# Patient Record
Sex: FEMALE | Race: WHITE | NOT HISPANIC OR LATINO | Employment: OTHER | URBAN - METROPOLITAN AREA
[De-identification: names, ages, dates, MRNs, and addresses within clinical notes are randomized per-mention and may not be internally consistent; named-entity substitution may affect disease eponyms.]

---

## 2017-09-06 ENCOUNTER — HOSPITAL ENCOUNTER (OUTPATIENT)
Dept: MAMMOGRAPHY | Facility: HOSPITAL | Age: 47
Discharge: HOME | End: 2017-09-06
Attending: OBSTETRICS & GYNECOLOGY

## 2017-09-06 DIAGNOSIS — Z12.31 VISIT FOR SCREENING MAMMOGRAM: ICD-10-CM

## 2017-11-20 ENCOUNTER — COMMUNICATION - HEALTHEAST (OUTPATIENT)
Dept: INTERNAL MEDICINE | Facility: CLINIC | Age: 47
End: 2017-11-20

## 2018-06-28 ENCOUNTER — OFFICE VISIT - HEALTHEAST (OUTPATIENT)
Dept: INTERNAL MEDICINE | Facility: CLINIC | Age: 48
End: 2018-06-28

## 2018-06-28 DIAGNOSIS — S62.101A FRACTURE OF WRIST, RIGHT, CLOSED, INITIAL ENCOUNTER: ICD-10-CM

## 2018-06-28 DIAGNOSIS — M81.0 OSTEOPOROSIS: ICD-10-CM

## 2018-06-28 LAB
ALBUMIN SERPL-MCNC: 4.8 G/DL (ref 3.5–5)
ALP SERPL-CCNC: 97 U/L (ref 45–120)
ALT SERPL W P-5'-P-CCNC: 44 U/L (ref 0–45)
ANION GAP SERPL CALCULATED.3IONS-SCNC: 11 MMOL/L (ref 5–18)
AST SERPL W P-5'-P-CCNC: 33 U/L (ref 0–40)
BILIRUB SERPL-MCNC: 0.4 MG/DL (ref 0–1)
BUN SERPL-MCNC: 13 MG/DL (ref 8–22)
CALCIUM SERPL-MCNC: 10.3 MG/DL (ref 8.5–10.5)
CALCIUM SERPL-MCNC: 10.4 MG/DL (ref 8.5–10.5)
CHLORIDE BLD-SCNC: 107 MMOL/L (ref 98–107)
CO2 SERPL-SCNC: 26 MMOL/L (ref 22–31)
CREAT SERPL-MCNC: 0.67 MG/DL (ref 0.6–1.1)
CREAT SERPL-MCNC: 0.67 MG/DL (ref 0.6–1.1)
ERYTHROCYTE [DISTWIDTH] IN BLOOD BY AUTOMATED COUNT: 11.7 % (ref 11–14.5)
GFR SERPL CREATININE-BSD FRML MDRD: >60 ML/MIN/1.73M2
GFR SERPL CREATININE-BSD FRML MDRD: >60 ML/MIN/1.73M2
GLUCOSE BLD-MCNC: 93 MG/DL (ref 70–125)
HCT VFR BLD AUTO: 44.3 % (ref 35–47)
HGB BLD-MCNC: 14.6 G/DL (ref 12–16)
MAGNESIUM SERPL-MCNC: 2.4 MG/DL (ref 1.8–2.6)
MCH RBC QN AUTO: 29.2 PG (ref 27–34)
MCHC RBC AUTO-ENTMCNC: 33 G/DL (ref 32–36)
MCV RBC AUTO: 89 FL (ref 80–100)
PHOSPHATE SERPL-MCNC: 4 MG/DL (ref 2.5–4.5)
PLATELET # BLD AUTO: 296 THOU/UL (ref 140–440)
PMV BLD AUTO: 7.2 FL (ref 7–10)
POTASSIUM BLD-SCNC: 4.1 MMOL/L (ref 3.5–5)
PROT SERPL-MCNC: 7.8 G/DL (ref 6–8)
PTH-INTACT SERPL-MCNC: 66 PG/ML (ref 10–86)
RBC # BLD AUTO: 5 MILL/UL (ref 3.8–5.4)
SODIUM SERPL-SCNC: 144 MMOL/L (ref 136–145)
TSH SERPL DL<=0.005 MIU/L-ACNC: 2.27 UIU/ML (ref 0.3–5)
WBC: 5.2 THOU/UL (ref 4–11)

## 2018-06-28 ASSESSMENT — MIFFLIN-ST. JEOR: SCORE: 1141.36

## 2018-06-29 LAB
25(OH)D3 SERPL-MCNC: 26.6 NG/ML (ref 30–80)
ALBUMIN PERCENT: 68.1 % (ref 51–67)
ALBUMIN SERPL ELPH-MCNC: 5.3 G/DL (ref 3.2–4.7)
ALPHA 1 PERCENT: 2.3 % (ref 2–4)
ALPHA 2 PERCENT: 9.5 % (ref 5–13)
ALPHA1 GLOB SERPL ELPH-MCNC: 0.2 G/DL (ref 0.1–0.3)
ALPHA2 GLOB SERPL ELPH-MCNC: 0.7 G/DL (ref 0.4–0.9)
B-GLOBULIN SERPL ELPH-MCNC: 0.9 G/DL (ref 0.7–1.2)
BETA PERCENT: 12.1 % (ref 10–17)
GAMMA GLOB SERPL ELPH-MCNC: 0.6 G/DL (ref 0.6–1.4)
GAMMA GLOBULIN PERCENT: 8 % (ref 9–20)
PATH ICD:: ABNORMAL
PROT PATTERN SERPL ELPH-IMP: ABNORMAL
PROT SERPL-MCNC: 7.8 G/DL (ref 6–8)
REVIEWING PATHOLOGIST: ABNORMAL

## 2018-06-30 LAB — ALP BONE SERPL-MCNC: 19.8 UG/L

## 2018-07-05 LAB
GLIADIN IGA SER-ACNC: 0.6 U/ML
GLIADIN IGG SER-ACNC: 5.9 U/ML
IGA SERPL-MCNC: 316 MG/DL (ref 65–400)
TTG IGA SER-ACNC: 0.7 U/ML
TTG IGG SER-ACNC: <0.6 U/ML

## 2018-07-06 ENCOUNTER — COMMUNICATION - HEALTHEAST (OUTPATIENT)
Dept: INTERNAL MEDICINE | Facility: CLINIC | Age: 48
End: 2018-07-06

## 2018-07-31 ENCOUNTER — OFFICE VISIT - HEALTHEAST (OUTPATIENT)
Dept: INTERNAL MEDICINE | Facility: CLINIC | Age: 48
End: 2018-07-31

## 2018-07-31 ENCOUNTER — RECORDS - HEALTHEAST (OUTPATIENT)
Dept: ADMINISTRATIVE | Facility: OTHER | Age: 48
End: 2018-07-31

## 2018-07-31 ENCOUNTER — RECORDS - HEALTHEAST (OUTPATIENT)
Dept: BONE DENSITY | Facility: CLINIC | Age: 48
End: 2018-07-31

## 2018-07-31 DIAGNOSIS — M81.0 OSTEOPOROSIS: ICD-10-CM

## 2018-07-31 DIAGNOSIS — M81.0 AGE-RELATED OSTEOPOROSIS WITHOUT CURRENT PATHOLOGICAL FRACTURE: ICD-10-CM

## 2019-02-01 ENCOUNTER — AMBULATORY - HEALTHEAST (OUTPATIENT)
Dept: NURSING | Facility: CLINIC | Age: 49
End: 2019-02-01

## 2019-08-29 ENCOUNTER — RECORDS - HEALTHEAST (OUTPATIENT)
Dept: ADMINISTRATIVE | Facility: OTHER | Age: 49
End: 2019-08-29

## 2019-08-29 ENCOUNTER — OFFICE VISIT - HEALTHEAST (OUTPATIENT)
Dept: INTERNAL MEDICINE | Facility: CLINIC | Age: 49
End: 2019-08-29

## 2019-08-29 ENCOUNTER — COMMUNICATION - HEALTHEAST (OUTPATIENT)
Dept: TELEHEALTH | Facility: CLINIC | Age: 49
End: 2019-08-29

## 2019-08-29 ENCOUNTER — RECORDS - HEALTHEAST (OUTPATIENT)
Dept: BONE DENSITY | Facility: CLINIC | Age: 49
End: 2019-08-29

## 2019-08-29 DIAGNOSIS — M81.0 AGE-RELATED OSTEOPOROSIS WITHOUT CURRENT PATHOLOGICAL FRACTURE: ICD-10-CM

## 2019-08-29 DIAGNOSIS — M81.8 OTHER OSTEOPOROSIS WITHOUT CURRENT PATHOLOGICAL FRACTURE: ICD-10-CM

## 2019-08-29 ASSESSMENT — MIFFLIN-ST. JEOR: SCORE: 1142.72

## 2019-09-11 ENCOUNTER — RECORDS - HEALTHEAST (OUTPATIENT)
Dept: LAB | Facility: CLINIC | Age: 49
End: 2019-09-11

## 2019-09-11 LAB
ALBUMIN SERPL-MCNC: 4.5 G/DL (ref 3.5–5)
ALP SERPL-CCNC: 68 U/L (ref 45–120)
ALT SERPL W P-5'-P-CCNC: 51 U/L (ref 0–45)
ANION GAP SERPL CALCULATED.3IONS-SCNC: 10 MMOL/L (ref 5–18)
AST SERPL W P-5'-P-CCNC: 29 U/L (ref 0–40)
BILIRUB SERPL-MCNC: 0.3 MG/DL (ref 0–1)
BUN SERPL-MCNC: 10 MG/DL (ref 8–22)
CALCIUM SERPL-MCNC: 9.1 MG/DL (ref 8.5–10.5)
CHLORIDE BLD-SCNC: 108 MMOL/L (ref 98–107)
CHOLEST SERPL-MCNC: 269 MG/DL
CO2 SERPL-SCNC: 22 MMOL/L (ref 22–31)
CREAT SERPL-MCNC: 0.65 MG/DL (ref 0.6–1.1)
FASTING STATUS PATIENT QL REPORTED: ABNORMAL
GFR SERPL CREATININE-BSD FRML MDRD: >60 ML/MIN/1.73M2
GLUCOSE BLD-MCNC: 91 MG/DL (ref 70–125)
HDLC SERPL-MCNC: 50 MG/DL
LDLC SERPL CALC-MCNC: 179 MG/DL
POTASSIUM BLD-SCNC: 4.2 MMOL/L (ref 3.5–5)
PROT SERPL-MCNC: 7.2 G/DL (ref 6–8)
SODIUM SERPL-SCNC: 140 MMOL/L (ref 136–145)
TRIGL SERPL-MCNC: 202 MG/DL

## 2019-09-13 LAB — VZV IGG SER QL IA: POSITIVE

## 2020-02-13 ENCOUNTER — AMBULATORY - HEALTHEAST (OUTPATIENT)
Dept: INTERNAL MEDICINE | Facility: CLINIC | Age: 50
End: 2020-02-13

## 2020-02-13 DIAGNOSIS — M81.8 OTHER OSTEOPOROSIS WITHOUT CURRENT PATHOLOGICAL FRACTURE: ICD-10-CM

## 2020-02-13 DIAGNOSIS — Z92.29 PERSONAL HISTORY OF OTHER DRUG THERAPY: ICD-10-CM

## 2020-03-02 ENCOUNTER — AMBULATORY - HEALTHEAST (OUTPATIENT)
Dept: NURSING | Facility: CLINIC | Age: 50
End: 2020-03-02

## 2020-03-02 RX ORDER — CALCIUM CARBONATE/MULTIVITAMIN
TABLET,CHEWABLE ORAL
Status: SHIPPED | COMMUNITY
Start: 2020-03-02 | End: 2021-10-27

## 2020-07-30 ENCOUNTER — COMMUNICATION - HEALTHEAST (OUTPATIENT)
Dept: INTERNAL MEDICINE | Facility: CLINIC | Age: 50
End: 2020-07-30

## 2020-07-30 DIAGNOSIS — M81.8 OTHER OSTEOPOROSIS WITHOUT CURRENT PATHOLOGICAL FRACTURE: ICD-10-CM

## 2020-09-02 ENCOUNTER — AMBULATORY - HEALTHEAST (OUTPATIENT)
Dept: INTERNAL MEDICINE | Facility: CLINIC | Age: 50
End: 2020-09-02

## 2020-09-02 DIAGNOSIS — M81.8 OTHER OSTEOPOROSIS WITHOUT CURRENT PATHOLOGICAL FRACTURE: ICD-10-CM

## 2020-09-02 DIAGNOSIS — Z92.29 PERSONAL HISTORY OF OTHER DRUG THERAPY: ICD-10-CM

## 2020-09-03 ENCOUNTER — RECORDS - HEALTHEAST (OUTPATIENT)
Dept: BONE DENSITY | Facility: CLINIC | Age: 50
End: 2020-09-03

## 2020-09-03 ENCOUNTER — COMMUNICATION - HEALTHEAST (OUTPATIENT)
Dept: TELEHEALTH | Facility: CLINIC | Age: 50
End: 2020-09-03

## 2020-09-03 ENCOUNTER — RECORDS - HEALTHEAST (OUTPATIENT)
Dept: ADMINISTRATIVE | Facility: OTHER | Age: 50
End: 2020-09-03

## 2020-09-03 DIAGNOSIS — M81.8 OTHER OSTEOPOROSIS WITHOUT CURRENT PATHOLOGICAL FRACTURE: ICD-10-CM

## 2020-09-16 ENCOUNTER — COMMUNICATION - HEALTHEAST (OUTPATIENT)
Dept: INTERNAL MEDICINE | Facility: CLINIC | Age: 50
End: 2020-09-16

## 2020-09-16 ENCOUNTER — OFFICE VISIT - HEALTHEAST (OUTPATIENT)
Dept: INTERNAL MEDICINE | Facility: CLINIC | Age: 50
End: 2020-09-16

## 2020-09-16 DIAGNOSIS — M81.8 OTHER OSTEOPOROSIS WITHOUT CURRENT PATHOLOGICAL FRACTURE: ICD-10-CM

## 2020-09-16 DIAGNOSIS — Z92.29 PERSONAL HISTORY OF OTHER DRUG THERAPY: ICD-10-CM

## 2020-09-16 DIAGNOSIS — S62.101A FRACTURE OF WRIST, RIGHT, CLOSED, INITIAL ENCOUNTER: ICD-10-CM

## 2020-09-24 ENCOUNTER — AMBULATORY - HEALTHEAST (OUTPATIENT)
Dept: LAB | Facility: CLINIC | Age: 50
End: 2020-09-24

## 2020-09-24 DIAGNOSIS — M81.8 OTHER OSTEOPOROSIS WITHOUT CURRENT PATHOLOGICAL FRACTURE: ICD-10-CM

## 2020-09-24 LAB
CALCIUM 24H UR-MRATE: 167 MG/24HR (ref 20–275)
CALCIUM UR-MCNC: 4.9 MG/DL
SPECIMEN VOL UR: 3400 ML

## 2020-09-29 ENCOUNTER — COMMUNICATION - HEALTHEAST (OUTPATIENT)
Dept: INTERNAL MEDICINE | Facility: CLINIC | Age: 50
End: 2020-09-29

## 2020-10-15 ENCOUNTER — OFFICE VISIT - HEALTHEAST (OUTPATIENT)
Dept: INTERNAL MEDICINE | Facility: CLINIC | Age: 50
End: 2020-10-15

## 2020-10-15 DIAGNOSIS — M81.8 OTHER OSTEOPOROSIS WITHOUT CURRENT PATHOLOGICAL FRACTURE: ICD-10-CM

## 2020-10-15 DIAGNOSIS — Z79.899 MEDICATION MANAGEMENT: ICD-10-CM

## 2020-10-16 LAB
ATRIAL RATE - MUSE: 66 BPM
DIASTOLIC BLOOD PRESSURE - MUSE: NORMAL
INTERPRETATION ECG - MUSE: NORMAL
P AXIS - MUSE: 66 DEGREES
PR INTERVAL - MUSE: 180 MS
QRS DURATION - MUSE: 74 MS
QT - MUSE: 404 MS
QTC - MUSE: 423 MS
R AXIS - MUSE: 50 DEGREES
SYSTOLIC BLOOD PRESSURE - MUSE: NORMAL
T AXIS - MUSE: 55 DEGREES
VENTRICULAR RATE- MUSE: 66 BPM

## 2020-11-10 ENCOUNTER — COMMUNICATION - HEALTHEAST (OUTPATIENT)
Dept: INTERNAL MEDICINE | Facility: CLINIC | Age: 50
End: 2020-11-10

## 2020-11-11 ENCOUNTER — OFFICE VISIT - HEALTHEAST (OUTPATIENT)
Dept: INTERNAL MEDICINE | Facility: CLINIC | Age: 50
End: 2020-11-11

## 2020-11-11 DIAGNOSIS — M81.8 OTHER OSTEOPOROSIS WITHOUT CURRENT PATHOLOGICAL FRACTURE: ICD-10-CM

## 2020-11-11 LAB
ANION GAP SERPL CALCULATED.3IONS-SCNC: 12 MMOL/L (ref 5–18)
BUN SERPL-MCNC: 12 MG/DL (ref 8–22)
CALCIUM SERPL-MCNC: 10 MG/DL (ref 8.5–10.5)
CALCIUM, IONIZED MEASURED: 1.27 MMOL/L (ref 1.11–1.3)
CHLORIDE BLD-SCNC: 107 MMOL/L (ref 98–107)
CO2 SERPL-SCNC: 24 MMOL/L (ref 22–31)
CREAT SERPL-MCNC: 0.68 MG/DL (ref 0.6–1.1)
GFR SERPL CREATININE-BSD FRML MDRD: >60 ML/MIN/1.73M2
GLUCOSE BLD-MCNC: 103 MG/DL (ref 70–125)
ION CA PH 7.4: 1.17 MMOL/L (ref 1.11–1.3)
PH: 7.25 (ref 7.35–7.45)
POTASSIUM BLD-SCNC: 4.6 MMOL/L (ref 3.5–5)
SODIUM SERPL-SCNC: 143 MMOL/L (ref 136–145)

## 2020-11-11 RX ORDER — ECHINACEA 400 MG
CAPSULE ORAL
Status: SHIPPED | COMMUNITY
Start: 2020-11-11 | End: 2022-10-13

## 2020-12-16 ENCOUNTER — OFFICE VISIT - HEALTHEAST (OUTPATIENT)
Dept: INTERNAL MEDICINE | Facility: CLINIC | Age: 50
End: 2020-12-16

## 2020-12-16 DIAGNOSIS — M81.8 OTHER OSTEOPOROSIS WITHOUT CURRENT PATHOLOGICAL FRACTURE: ICD-10-CM

## 2021-01-13 ENCOUNTER — OFFICE VISIT - HEALTHEAST (OUTPATIENT)
Dept: INTERNAL MEDICINE | Facility: CLINIC | Age: 51
End: 2021-01-13

## 2021-01-13 DIAGNOSIS — M81.8 OTHER OSTEOPOROSIS WITHOUT CURRENT PATHOLOGICAL FRACTURE: ICD-10-CM

## 2021-02-10 ENCOUNTER — OFFICE VISIT - HEALTHEAST (OUTPATIENT)
Dept: INTERNAL MEDICINE | Facility: CLINIC | Age: 51
End: 2021-02-10

## 2021-02-10 DIAGNOSIS — M81.8 OTHER OSTEOPOROSIS WITHOUT CURRENT PATHOLOGICAL FRACTURE: ICD-10-CM

## 2021-03-11 ENCOUNTER — OFFICE VISIT - HEALTHEAST (OUTPATIENT)
Dept: INTERNAL MEDICINE | Facility: CLINIC | Age: 51
End: 2021-03-11

## 2021-03-11 DIAGNOSIS — M81.8 OTHER OSTEOPOROSIS WITHOUT CURRENT PATHOLOGICAL FRACTURE: ICD-10-CM

## 2021-03-11 RX ORDER — OMEPRAZOLE 10 MG/1
20 CAPSULE, DELAYED RELEASE ORAL
Status: SHIPPED | COMMUNITY
Start: 2021-03-11 | End: 2021-08-11

## 2021-04-08 ENCOUNTER — OFFICE VISIT - HEALTHEAST (OUTPATIENT)
Dept: INTERNAL MEDICINE | Facility: CLINIC | Age: 51
End: 2021-04-08

## 2021-04-08 DIAGNOSIS — E28.319 PREMATURE MENOPAUSE: ICD-10-CM

## 2021-04-08 DIAGNOSIS — Z80.3 FAMILY HISTORY OF MALIGNANT NEOPLASM OF BREAST: ICD-10-CM

## 2021-04-08 DIAGNOSIS — R79.89 ABNORMAL LIVER FUNCTION TEST: ICD-10-CM

## 2021-04-08 DIAGNOSIS — R63.5 WEIGHT GAIN: ICD-10-CM

## 2021-04-08 DIAGNOSIS — M81.8 OTHER OSTEOPOROSIS WITHOUT CURRENT PATHOLOGICAL FRACTURE: ICD-10-CM

## 2021-04-08 LAB
ALBUMIN SERPL-MCNC: 4.3 G/DL (ref 3.5–5)
ALP SERPL-CCNC: 120 U/L (ref 45–120)
ALT SERPL W P-5'-P-CCNC: 67 U/L (ref 0–45)
ANION GAP SERPL CALCULATED.3IONS-SCNC: 11 MMOL/L (ref 5–18)
AST SERPL W P-5'-P-CCNC: 41 U/L (ref 0–40)
BILIRUB SERPL-MCNC: 0.3 MG/DL (ref 0–1)
BUN SERPL-MCNC: 9 MG/DL (ref 8–22)
CALCIUM SERPL-MCNC: 9.4 MG/DL (ref 8.5–10.5)
CHLORIDE BLD-SCNC: 110 MMOL/L (ref 98–107)
CO2 SERPL-SCNC: 24 MMOL/L (ref 22–31)
CREAT SERPL-MCNC: 0.67 MG/DL (ref 0.6–1.1)
ERYTHROCYTE [DISTWIDTH] IN BLOOD BY AUTOMATED COUNT: 12.6 % (ref 11–14.5)
GFR SERPL CREATININE-BSD FRML MDRD: >60 ML/MIN/1.73M2
GLUCOSE BLD-MCNC: 93 MG/DL (ref 70–125)
HCT VFR BLD AUTO: 39.8 % (ref 35–47)
HGB BLD-MCNC: 13.2 G/DL (ref 12–16)
MCH RBC QN AUTO: 29.9 PG (ref 27–34)
MCHC RBC AUTO-ENTMCNC: 33.2 G/DL (ref 32–36)
MCV RBC AUTO: 90 FL (ref 80–100)
PLATELET # BLD AUTO: 267 THOU/UL (ref 140–440)
PMV BLD AUTO: 9.4 FL (ref 7–10)
POTASSIUM BLD-SCNC: 4.1 MMOL/L (ref 3.5–5)
PROT SERPL-MCNC: 7.1 G/DL (ref 6–8)
RBC # BLD AUTO: 4.42 MILL/UL (ref 3.8–5.4)
SODIUM SERPL-SCNC: 145 MMOL/L (ref 136–145)
TSH SERPL DL<=0.005 MIU/L-ACNC: 2.68 UIU/ML (ref 0.3–5)
WBC: 4.8 THOU/UL (ref 4–11)

## 2021-04-09 ENCOUNTER — COMMUNICATION - HEALTHEAST (OUTPATIENT)
Dept: INTERNAL MEDICINE | Facility: CLINIC | Age: 51
End: 2021-04-09

## 2021-04-09 DIAGNOSIS — R74.8 ABNORMAL LIVER ENZYMES: ICD-10-CM

## 2021-04-09 LAB
25(OH)D3 SERPL-MCNC: 34.6 NG/ML (ref 30–80)
25(OH)D3 SERPL-MCNC: 34.6 NG/ML (ref 30–80)
HAV IGM SERPL QL IA: NEGATIVE
HBV CORE IGM SERPL QL IA: NEGATIVE
HBV SURFACE AG SERPL QL IA: NEGATIVE
HCV AB SERPL QL IA: NEGATIVE

## 2021-04-14 ENCOUNTER — HOSPITAL ENCOUNTER (OUTPATIENT)
Dept: ULTRASOUND IMAGING | Facility: CLINIC | Age: 51
Discharge: HOME | End: 2021-04-14
Attending: INTERNAL MEDICINE

## 2021-04-14 DIAGNOSIS — R74.8 ABNORMAL LIVER ENZYMES: ICD-10-CM

## 2021-05-06 ENCOUNTER — OFFICE VISIT - HEALTHEAST (OUTPATIENT)
Dept: INTERNAL MEDICINE | Facility: CLINIC | Age: 51
End: 2021-05-06

## 2021-05-06 DIAGNOSIS — R74.8 ELEVATED LIVER ENZYMES: ICD-10-CM

## 2021-05-06 DIAGNOSIS — M81.8 OTHER OSTEOPOROSIS WITHOUT CURRENT PATHOLOGICAL FRACTURE: ICD-10-CM

## 2021-05-06 DIAGNOSIS — R04.0 NASAL BLEEDING: ICD-10-CM

## 2021-05-06 LAB
ALBUMIN SERPL-MCNC: 4.5 G/DL (ref 3.5–5)
ALP SERPL-CCNC: 120 U/L (ref 45–120)
ALT SERPL W P-5'-P-CCNC: 43 U/L (ref 0–45)
ANION GAP SERPL CALCULATED.3IONS-SCNC: 12 MMOL/L (ref 5–18)
APTT PPP: 30 SECONDS (ref 24–37)
AST SERPL W P-5'-P-CCNC: 26 U/L (ref 0–40)
BILIRUB SERPL-MCNC: 0.4 MG/DL (ref 0–1)
BUN SERPL-MCNC: 9 MG/DL (ref 8–22)
CALCIUM SERPL-MCNC: 9.7 MG/DL (ref 8.5–10.5)
CHLORIDE BLD-SCNC: 107 MMOL/L (ref 98–107)
CO2 SERPL-SCNC: 24 MMOL/L (ref 22–31)
CREAT SERPL-MCNC: 0.7 MG/DL (ref 0.6–1.1)
ERYTHROCYTE [DISTWIDTH] IN BLOOD BY AUTOMATED COUNT: 12.6 % (ref 11–14.5)
FERRITIN SERPL-MCNC: 90 NG/ML (ref 10–130)
GFR SERPL CREATININE-BSD FRML MDRD: >60 ML/MIN/1.73M2
GLUCOSE BLD-MCNC: 94 MG/DL (ref 70–125)
HCT VFR BLD AUTO: 42.2 % (ref 35–47)
HGB BLD-MCNC: 13.9 G/DL (ref 12–16)
INR PPP: 0.94 (ref 0.9–1.1)
IRON SATN MFR SERPL: 30 % (ref 20–50)
IRON SERPL-MCNC: 104 UG/DL (ref 42–175)
MCH RBC QN AUTO: 29.6 PG (ref 27–34)
MCHC RBC AUTO-ENTMCNC: 32.9 G/DL (ref 32–36)
MCV RBC AUTO: 90 FL (ref 80–100)
PLATELET # BLD AUTO: 296 THOU/UL (ref 140–440)
PMV BLD AUTO: 9.6 FL (ref 7–10)
POTASSIUM BLD-SCNC: 4.7 MMOL/L (ref 3.5–5)
PROT SERPL-MCNC: 7.4 G/DL (ref 6–8)
RBC # BLD AUTO: 4.7 MILL/UL (ref 3.8–5.4)
SODIUM SERPL-SCNC: 143 MMOL/L (ref 136–145)
TIBC SERPL-MCNC: 341 UG/DL (ref 313–563)
TRANSFERRIN SERPL-MCNC: 273 MG/DL (ref 212–360)
WBC: 5 THOU/UL (ref 4–11)

## 2021-05-07 ENCOUNTER — AMBULATORY - HEALTHEAST (OUTPATIENT)
Dept: NURSING | Facility: CLINIC | Age: 51
End: 2021-05-07

## 2021-05-27 ENCOUNTER — RECORDS - HEALTHEAST (OUTPATIENT)
Dept: ADMINISTRATIVE | Facility: CLINIC | Age: 51
End: 2021-05-27

## 2021-05-27 VITALS
OXYGEN SATURATION: 99 % | HEART RATE: 69 BPM | WEIGHT: 135.9 LBS | SYSTOLIC BLOOD PRESSURE: 128 MMHG | DIASTOLIC BLOOD PRESSURE: 70 MMHG

## 2021-05-30 ENCOUNTER — RECORDS - HEALTHEAST (OUTPATIENT)
Dept: ADMINISTRATIVE | Facility: CLINIC | Age: 51
End: 2021-05-30

## 2021-05-31 NOTE — PATIENT INSTRUCTIONS - HE
Prolia 10th today.  Prolia 11th in 6 months with my nurse. I will see you in 1 year.    DXA in 1 year .   Phone number to schedule [phone].    Daily calcium need is 5954-7961 mg a day from the diet and supplements.  Calcium citrate is easier to digest.  Vitamin D 2000 IU daily recommended.

## 2021-05-31 NOTE — PROGRESS NOTES
"  1. Other osteoporosis without current pathological fracture       Patient was educated on safety of Prolia utilizing Patient Counseling Chart for Healthcare Providers, as outlined by the Prolia REMS progam.     Return in about 6 months (around 2/29/2020) for Recheck, Prolia injection.    Patient Instructions   Prolia 10th today.  Prolia 11th in 6 months with my nurse. I will see you in 1 year.    DXA in 1 year .   Phone number to schedule [phone].    Daily calcium need is 9742-6872 mg a day from the diet and supplements.  Calcium citrate is easier to digest.  Vitamin D 2000 IU daily recommended.      Chief Complaint   Patient presents with     Osteoporosis Follow Up       /72 (Patient Site: Right Arm, Patient Position: Sitting, Cuff Size: Adult Regular)   Pulse 80   Ht 5' 2.5\" (1.588 m)   Wt 126 lb (57.2 kg)   BMI 22.68 kg/m        Did you experience any problems with previous Prolia injection? no  Any medication change in the last 6 months? no  Did you take prednisone or other immunosupressant drugs in the last 6 months   (chemo, transplant, rheum, dermatology conditions)? no  Did you have any serious infection in the last 6 months?no  Any recent hospitalizations?no  Do you plan any dental work in the next 2-3 months?no  How much calcium do you take daily from the diet and supplements?1200 mg  How much vit D do you take daily? 2000 IU  Last DXA? Done today, Reviewed and discussed      Patient is here today for the 10th Prolia injection. Patient tolerated previous injections well.   We discussed calcium and vit D daily needs today.   Next Prolia injection will be in 6 months.     15 minutes spent with the patient and more then 50 % of the time in counseling.  This note has been dictated using voice recognition software. Any grammatical or context distortions are unintentional and inherent to the software      Patient Active Problem List   Diagnosis     Osteoporosis     Fracture of wrist, right, " closed, initial encounter       Current Outpatient Medications on File Prior to Visit   Medication Sig Dispense Refill     B infantis/B ani/B bairon/B bifid (PROBIOTIC 4X ORAL) Take 1 capsule by mouth daily.       CALCIUM-MAGNESIUM-ZINC ORAL Take 1 tablet by mouth daily. Includes 810 U Vitamin d             Current Facility-Administered Medications on File Prior to Visit   Medication Dose Route Frequency Provider Last Rate Last Dose     denosumab 60 mg (PROLIA 60 mg/ml)  60 mg Subcutaneous Q6 Months Mera Taylor MD   60 mg at 09/09/15 0843     denosumab 60 mg (PROLIA 60 mg/ml)  60 mg Subcutaneous Q6 Months Saira Olivas MD   60 mg at 02/01/19 1100

## 2021-06-01 ENCOUNTER — RECORDS - HEALTHEAST (OUTPATIENT)
Dept: ADMINISTRATIVE | Facility: CLINIC | Age: 51
End: 2021-06-01

## 2021-06-01 VITALS — BODY MASS INDEX: 22.27 KG/M2 | HEIGHT: 63 IN | WEIGHT: 125.7 LBS

## 2021-06-01 VITALS — WEIGHT: 126 LBS | BODY MASS INDEX: 22.68 KG/M2

## 2021-06-02 ENCOUNTER — RECORDS - HEALTHEAST (OUTPATIENT)
Dept: ADMINISTRATIVE | Facility: CLINIC | Age: 51
End: 2021-06-02

## 2021-06-03 VITALS — BODY MASS INDEX: 22.32 KG/M2 | WEIGHT: 126 LBS | HEIGHT: 63 IN

## 2021-06-04 VITALS
DIASTOLIC BLOOD PRESSURE: 64 MMHG | OXYGEN SATURATION: 98 % | SYSTOLIC BLOOD PRESSURE: 121 MMHG | WEIGHT: 132.9 LBS | HEART RATE: 71 BPM | BODY MASS INDEX: 23.92 KG/M2

## 2021-06-05 VITALS
SYSTOLIC BLOOD PRESSURE: 98 MMHG | HEART RATE: 82 BPM | OXYGEN SATURATION: 99 % | WEIGHT: 135.6 LBS | DIASTOLIC BLOOD PRESSURE: 61 MMHG | BODY MASS INDEX: 24.41 KG/M2

## 2021-06-05 VITALS
DIASTOLIC BLOOD PRESSURE: 80 MMHG | WEIGHT: 136.9 LBS | BODY MASS INDEX: 24.64 KG/M2 | SYSTOLIC BLOOD PRESSURE: 130 MMHG | HEART RATE: 77 BPM | OXYGEN SATURATION: 98 %

## 2021-06-05 VITALS
HEART RATE: 87 BPM | BODY MASS INDEX: 24.68 KG/M2 | SYSTOLIC BLOOD PRESSURE: 120 MMHG | OXYGEN SATURATION: 100 % | WEIGHT: 137.1 LBS | DIASTOLIC BLOOD PRESSURE: 73 MMHG

## 2021-06-05 VITALS
DIASTOLIC BLOOD PRESSURE: 70 MMHG | SYSTOLIC BLOOD PRESSURE: 119 MMHG | OXYGEN SATURATION: 99 % | WEIGHT: 137.1 LBS | HEART RATE: 92 BPM | BODY MASS INDEX: 24.68 KG/M2

## 2021-06-05 VITALS
HEART RATE: 78 BPM | BODY MASS INDEX: 24.03 KG/M2 | DIASTOLIC BLOOD PRESSURE: 60 MMHG | WEIGHT: 133.5 LBS | OXYGEN SATURATION: 98 % | SYSTOLIC BLOOD PRESSURE: 119 MMHG

## 2021-06-05 VITALS
OXYGEN SATURATION: 100 % | DIASTOLIC BLOOD PRESSURE: 77 MMHG | SYSTOLIC BLOOD PRESSURE: 122 MMHG | BODY MASS INDEX: 24.17 KG/M2 | WEIGHT: 134.3 LBS | HEART RATE: 80 BPM

## 2021-06-05 VITALS
HEART RATE: 78 BPM | OXYGEN SATURATION: 99 % | DIASTOLIC BLOOD PRESSURE: 73 MMHG | WEIGHT: 134.4 LBS | SYSTOLIC BLOOD PRESSURE: 120 MMHG | BODY MASS INDEX: 24.19 KG/M2

## 2021-06-06 NOTE — PROGRESS NOTES
1. Did you experience any problems with previous Prolia injection? no  2. Do you feel sick today?(fever, RS, GI,  issues)? no  3. Any medication changes in the last 6 months? no  4. Did you take prednisone or other immunosuppressant drugs in the last 6 months?(chemo, transplant, rheu, dermatology conditions)? no  5. Did you have any serious infection in the last 6 months? (pancreatitis) no  6. Any recent hospitalizations/ surgeries (especially gastric bypass, thyroid, parathyroid)? no  7. Do you plan any dental work?(especially implants and extractions) in the next 2-3 months? no  8. Did you have any fractures in the last year? no      The following steps were completed to comply with the REMS program for Prolia:  1. Ordering provider has previously reviewed information in the Medication Guide and Patient Counseling Chart, including the serious risks of Prolia  and the symptoms of each risk and have been advised to seek prompt medical attention if they have signs or symptoms of any of the serious risks.  2. Provided each patient a copy of the Medication Guide and Patient Brochure.  See MAR for administration details.   Indication: Prolia  (denosumab) is a prescription medicine used to treat osteoporosis in patients who:   Are at high risk for fracture, meaning patients who have had a fracture related to osteoporosis, or who have multiple risk factors for fracture; Cannot use another osteoporosis medicine or other osteoporosis medicines did not work well.   The timeline for early/late injections would be 4 weeks early and any time after the 6 month grover. If a patient receives their injection late, then the subsequent injection would be 6 months from the date that they actually received the injection    Have the screening questions been asked prior to this administration? Yes

## 2021-06-08 ENCOUNTER — OFFICE VISIT - HEALTHEAST (OUTPATIENT)
Dept: INTERNAL MEDICINE | Facility: CLINIC | Age: 51
End: 2021-06-08

## 2021-06-08 DIAGNOSIS — M81.8 OTHER OSTEOPOROSIS WITHOUT CURRENT PATHOLOGICAL FRACTURE: ICD-10-CM

## 2021-06-08 DIAGNOSIS — M54.2 ARTHRALGIA OF NECK: ICD-10-CM

## 2021-06-08 ASSESSMENT — MIFFLIN-ST. JEOR: SCORE: 1192.62

## 2021-06-10 NOTE — TELEPHONE ENCOUNTER
Who is calling:  Patient  Reason for Call: Questioning the status of the below message that was left on 07/30/2020. Patient is upset the message has not been addressed. Please reach out to the patient and advise.  Date of last appointment with primary care: 08/29/2019  Okay to leave a detailed message: Yes

## 2021-06-10 NOTE — TELEPHONE ENCOUNTER
Pt informed that order was placed two weeks ago. Unsure why she wasn't notified. Pt given number to schedule dexa scan

## 2021-06-10 NOTE — TELEPHONE ENCOUNTER
Orders being requested:   Dexa Scan    Reason service is needed/diagnosis:   Osteoporosis    When are orders needed by:   STAT    Where to send Orders: [facility]     Okay to leave detailed message?  Yes    Please place order and notify patient to schedule.

## 2021-06-10 NOTE — TELEPHONE ENCOUNTER
Patient Instructions 8/29/19   Prolia 10th today.  Prolia 11th in 6 months with my nurse. I will see you in 1 year.     DXA in 1 year .

## 2021-06-11 NOTE — TELEPHONE ENCOUNTER
Patient notified and scheduled for tomorrow for lab.  Gianna Henry CMA ............... 5:10 PM, 09/16/20

## 2021-06-11 NOTE — TELEPHONE ENCOUNTER
I am still waiting to speak with someone from her insurance. They did not call me back. I hope to finish that next week and will call her back. She does not need to see cardiologist.

## 2021-06-11 NOTE — TELEPHONE ENCOUNTER
Please call the pt. I would like her to do 24h urine calcium test. Order is in, she can schedule lab only. Thanks

## 2021-06-11 NOTE — TELEPHONE ENCOUNTER
No. I am still waiting for response from her insurance about Evenity. If they pay for Prolia now, we will not be able to get Evenity covered.

## 2021-06-11 NOTE — PROGRESS NOTES
1. Other osteoporosis without current pathological fracture  romosozumab-aqqg injection 210 mg ([facility]ENITY)   2. Personal history of other drug therapy  romosozumab-aqqg injection 210 mg ([facility])   3. Fracture of wrist, right, closed, initial encounter  romosozumab-aqqg injection 210 mg ([facility])     Patient was educated on safety of Prolia utilizing Patient Counseling Chart for Healthcare Providers, as outlined by the Prolia REMS progam.     Return in about 4 weeks (around 10/14/2020) for Recheck, osteoporosis.    Patient Instructions   Read about Evenity.  I will call you when I find out if we can do it.      Chief Complaint   Patient presents with     Osteoporosis Follow Up     Osteo F/U       /64   Pulse 71   Wt 132 lb 14.4 oz (60.3 kg)   SpO2 98%   BMI 23.92 kg/m        Did you experience any problems with previous Prolia injection? no  Any medication change in the last 6 months? no  Did you take prednisone or other immunosupressant drugs in the last 6 months   (chemo, transplant, rheum, dermatology conditions)? no  Did you have any serious infection in the last 6 months?no  Any recent hospitalizations?no  Do you plan any dental work in the next 2-3 months?no  How much calcium do you take daily from the diet and supplements?1200 mg  How much vit D do you take daily? 2000 IU  Last DXA? 9/3/20  Reviewed and discussed      Patient is here today for the  Prolia injection. Patient tolerated previous injections well. On Prolia treatment from 6963-7966 and 7/2018 - now.  DXA showed decline in spine bone density again -6.7% compared to 2019.  Workup for secondary causes of osteoporosis done last year was normal:  Component      Latest Ref Rng & Units 6/28/2018 6/28/2018           3:59 PM  3:59 PM   Sodium      136 - 145 mmol/L  144   Potassium      3.5 - 5.0 mmol/L  4.1   Chloride      98 - 107 mmol/L  107   CO2      22 - 31 mmol/L  26   Anion Gap, Calculation      5 - 18 mmol/L  11   Glucose      70 - 125  mg/dL  93   BUN      8 - 22 mg/dL  13   Creatinine      0.60 - 1.10 mg/dL 0.67 0.67   GFR MDRD Af Amer      >60 mL/min/1.73m2 >60 >60   GFR MDRD Non Af Amer      >60 mL/min/1.73m2 >60 >60   Bilirubin, Total      0.0 - 1.0 mg/dL  0.4   Calcium      8.5 - 10.5 mg/dL 10.4 10.3   Protein, Total      6.0 - 8.0 g/dL  7.8   ALBUMIN      3.5 - 5.0 g/dL  4.8   Alkaline Phosphatase      45 - 120 U/L  97   AST      0 - 40 U/L  33   ALT      0 - 45 U/L  44   Albumin %      51.0 - 67.0 %     Albumin       3.2 - 4.7 g/dL     Alpha 1 %      2.0 - 4.0 %     Alpha 1      0.1 - 0.3 g/dL     Alpha 2 %      5.0 - 13.0 %     Alpha 2      0.4 - 0.9 g/dL     % Beta      10.0 - 17.0 %     Beta      0.7 - 1.2 g/dL     Gamma Globulin %      9.0 - 20.0 %     Gamma Globulin      0.6 - 1.4 g/dL     ELP Comment           Path ICD:           Interpreted By:           WBC      4.0 - 11.0 thou/uL 5.2    RBC      3.80 - 5.40 mill/uL 5.00    Hemoglobin      12.0 - 16.0 g/dL 14.6    Hematocrit      35.0 - 47.0 % 44.3    MCV      80 - 100 fL 89    MCH      27.0 - 34.0 pg 29.2    MCHC      32.0 - 36.0 g/dL 33.0    RDW      11.0 - 14.5 % 11.7    Platelets      140 - 440 thou/uL 296    MPV      7.0 - 10.0 fL 7.2    PTH      10 - 86 pg/mL 66    Phosphorus      2.5 - 4.5 mg/dL 4.0    Gliadin IgA      0.0-<7.0 U/mL 0.6    Gliadin IgG      0.0-<7.0 U/mL 5.9    Tissue Transglutaminase IgG AB      0.0-<7.0 U/mL <0.6    Tissue Transglutaminase IgA AB      0.0-<7.0 U/mL 0.7    IGA      65 - 400 mg/dL 316    TSH      0.30 - 5.00 uIU/mL 2.27    Vitamin D, Total (25-Hydroxy)      30.0 - 80.0 ng/mL 26.6 (L)    Bone Specific Alkaline Phosphatase      ug/L 19.8    Magnesium      1.8 - 2.6 mg/dL 2.4      Component      Latest Ref Rng & Units 6/28/2018           3:59 PM   Sodium      136 - 145 mmol/L    Potassium      3.5 - 5.0 mmol/L    Chloride      98 - 107 mmol/L    CO2      22 - 31 mmol/L    Anion Gap, Calculation      5 - 18 mmol/L    Glucose      70 - 125 mg/dL     BUN      8 - 22 mg/dL    Creatinine      0.60 - 1.10 mg/dL    GFR MDRD Af Amer      >60 mL/min/1.73m2    GFR MDRD Non Af Amer      >60 mL/min/1.73m2    Bilirubin, Total      0.0 - 1.0 mg/dL    Calcium      8.5 - 10.5 mg/dL    Protein, Total      6.0 - 8.0 g/dL 7.8   ALBUMIN      3.5 - 5.0 g/dL    Alkaline Phosphatase      45 - 120 U/L    AST      0 - 40 U/L    ALT      0 - 45 U/L    Albumin %      51.0 - 67.0 % 68.1 (H)   Albumin       3.2 - 4.7 g/dL 5.3 (H)   Alpha 1 %      2.0 - 4.0 % 2.3   Alpha 1      0.1 - 0.3 g/dL 0.2   Alpha 2 %      5.0 - 13.0 % 9.5   Alpha 2      0.4 - 0.9 g/dL 0.7   % Beta      10.0 - 17.0 % 12.1   Beta      0.7 - 1.2 g/dL 0.9   Gamma Globulin %      9.0 - 20.0 % 8.0 (L)   Gamma Globulin      0.6 - 1.4 g/dL 0.6   ELP Comment       Increased albumin, which can be seen in dehydration among other disorders. Albumin levels may be elevated in dehydration, congestive heart failure, poor protein utilization, glucocorticoid excess, and congenital causes among possibilities. Clinical correlation is required.   Path ICD:       M81.0   Interpreted By:       Russ Castillo MD   WBC      4.0 - 11.0 thou/uL    RBC      3.80 - 5.40 mill/uL    Hemoglobin      12.0 - 16.0 g/dL    Hematocrit      35.0 - 47.0 %    MCV      80 - 100 fL    MCH      27.0 - 34.0 pg    MCHC      32.0 - 36.0 g/dL    RDW      11.0 - 14.5 %    Platelets      140 - 440 thou/uL    MPV      7.0 - 10.0 fL    PTH      10 - 86 pg/mL    Phosphorus      2.5 - 4.5 mg/dL    Gliadin IgA      0.0-<7.0 U/mL    Gliadin IgG      0.0-<7.0 U/mL    Tissue Transglutaminase IgG AB      0.0-<7.0 U/mL    Tissue Transglutaminase IgA AB      0.0-<7.0 U/mL    IGA      65 - 400 mg/dL    TSH      0.30 - 5.00 uIU/mL    Vitamin D, Total (25-Hydroxy)      30.0 - 80.0 ng/mL    Bone Specific Alkaline Phosphatase      ug/L    Magnesium      1.8 - 2.6 mg/dL    Nothing changes in her health, diet and exercise.  We discussed calcium and vit D daily needs today.    I would like to check 24 h urine calcium.   We spent a lot of time discussing other treatment option. Anabolic agents vs evenity. I sent PA for Evenity and if approved, will start the treatment. ALl reading material provided to the patient.  I will wait for approval of Evenity and will speak with her when done.    Patient was educated on safety of Prolia utilizing Patient Counseling Chart for Healthcare Providers, as outlined by the Prolia REMS progam.     25 minutes spent with the patient and more then 50 % of the time in counseling about osteoporosis, available osteoporosis treatment options, medication side effects and contraindications, supplement use and weightbearing exercise.    This note has been dictated using voice recognition software. Any grammatical or context distortions are unintentional and inherent to the software      Patient Active Problem List   Diagnosis     Osteoporosis     Fracture of wrist, right, closed, initial encounter       Current Outpatient Medications on File Prior to Visit   Medication Sig Dispense Refill     B infantis/B ani/B bairon/B bifid (PROBIOTIC 4X ORAL) Take 1 capsule by mouth daily.       CALCIUM-MAGNESIUM-ZINC ORAL Take 1 tablet by mouth daily. Includes 810 U Vitamin d             multivitamin-calcium carb Chew 1 tab(s)       Current Facility-Administered Medications on File Prior to Visit   Medication Dose Route Frequency Provider Last Rate Last Dose     denosumab 60 mg (PROLIA 60 mg/ml)  60 mg Subcutaneous Q6 Months Saira Olivas MD         [DISCONTINUED] denosumab 60 mg (PROLIA 60 mg/ml)  60 mg Subcutaneous Q6 Months Mera Taylor MD   60 mg at 08/29/19 1636     [DISCONTINUED] denosumab 60 mg (PROLIA 60 mg/ml)  60 mg Subcutaneous Q6 Months Saira Olivas MD

## 2021-06-11 NOTE — TELEPHONE ENCOUNTER
Peer to peer review scheduled this afternoon with Dr. Olivas.  Gianna Henry [facility] ............... 1:40 PM, 10/01/20

## 2021-06-11 NOTE — TELEPHONE ENCOUNTER
Patient informed. Please advise- Patient wants to know whether she would need a cardiac workup prior to starting Evenity? Daughter recently diagnosed with long QT syndrome and starting genetic testing for it to see if it is hereditary.    Of note, patient noted insurance stated on 09/21/20 in a letter that Evenity was not approved due to bone density not improving with use of Prolia, not receiving a bone density report supporting high fracture risk, documentation of menopausal status, evidence of previous fracture because of osteoporosis, evidence of failing multiple types of treatment for osteoporosis, evidence of not having a previous heart attack, and/or evidence of not having risk factors for heart attack or stroke. The patient was reassured that Dr. Olivas has since sent them a letter of necessity and scheduled a telephone meeting with them to try to get this medication covered. Patient aware that she will be informed when we have more info.

## 2021-06-12 NOTE — PROGRESS NOTES
Assessment/Plan:        1. Medication management  Electrocardiogram Perform and Read   2. Other osteoporosis without current pathological fracture         Return in about 4 weeks (around 11/12/2020) for Recheck, osteoporosis.    Patient Instructions   Evenity 1st dose today.      Chief Complaint   Patient presents with     Osteoporosis     48 yo female is here today for starting the treatment with Evenity for osteoporosis.  DXA done in 9/2020, showed decline in spine bone density again -6.7% compared to 2019. Secondary workup negative. She was on HRT, BSP and Prolia. Prolia since 2012.  She had a lot of wuestions about side effects and cardiovascular risks.  We discussed all in details. EKG done today per my review NSR, HR 66, cQTC 423ms. He daughter was diagnosed with prolonged QTC syndrome. Patient never had cardiac issues.    Adverse reaction:  >10%: Neuromuscular & skeletal: Arthralgia (8% to 13%)  1% to 10%:  Cardiovascular: Cardiac disorder (2%), peripheral edema (2%)  Central nervous system: Headache (5% to 7%), insomnia (2%), paresthesia (1%)  Dermatologic: Skin rash (1%)  Hypersensitivity: Hypersensitivity reaction (7%)  Local: Injection site reaction (5%), pain at injection site (2%), erythema at injection site (1%)  Neuromuscular & skeletal: Muscle spasm (3% to 5%), asthenia (3%), neck pain (2%)  <1%, postmarketing, and/or case reports: Acute myocardial infarction, angioedema, cerebrovascular accident, dermatitis, erythema multiforme, femur fracture, hypocalcemia, osteonecrosis of the jaw, urticaria    First Evenity dose today. She will come to see me monthly. She will call if nay concerns.    /77   Pulse 80   Wt 134 lb 4.8 oz (60.9 kg)   SpO2 100%   BMI 24.17 kg/m    25 minutes spent with the patient and more then 50 % of the time in counseling.  This note has been dictated using voice recognition software. Any grammatical or context distortions are unintentional and inherent to the  software      Patient Active Problem List   Diagnosis     Osteoporosis     Fracture of wrist, right, closed, initial encounter       Current Outpatient Medications on File Prior to Visit   Medication Sig Dispense Refill     CALCIUM-MAGNESIUM-ZINC ORAL Take 1 tablet by mouth daily. Includes 810 U Vitamin d             multivitamin-calcium carb Chew 1 tab(s)       B infantis/B ani/B bairon/B bifid (PROBIOTIC 4X ORAL) Take 1 capsule by mouth daily.       Current Facility-Administered Medications on File Prior to Visit   Medication Dose Route Frequency Provider Last Rate Last Dose     romosozumab-aqqg injection 210 mg ([facility])  210 mg Subcutaneous Q30 Days Saira Olivas MD         [DISCONTINUED] denosumab 60 mg (PROLIA 60 mg/ml)  60 mg Subcutaneous Q6 Months Saira Olivas MD
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
## 2021-06-13 NOTE — PROGRESS NOTES
Assessment/Plan:        1. Other osteoporosis without current pathological fracture         Return in about 4 weeks (around 1/13/2021) for Recheck, osteoporosis.    Patient Instructions   3rd Evenity today      Chief Complaint   Patient presents with     Osteoporosis Follow Up     osteo F/U     Patient is here today for follow up of osteoporosis treatment with Evenity, fist dose was given on 10/15/20, second 11/11/20.   She reports side effects of localized swelling and redness for 2 days around the injection site in both arms/legs. We did arms first months and legs second month. It did hurt much more when given in the thighs.  She also noticed headache for about a week after injection. She gained 6 lbs in the last 2 months.  We reviewed together all reported side effects:  >10%: Neuromuscular & skeletal: Arthralgia (8% to 13%)  1% to 10%:  Cardiovascular: Cardiac disorder (2%), peripheral edema (2%)  Central nervous system: Headache (5% to 7%), insomnia (2%), paresthesia (1%)  Dermatologic: Skin rash (1%)  Hypersensitivity: Hypersensitivity reaction (7%)  Local: Injection site reaction (5%), pain at injection site (2%), erythema at injection site (1%)  Neuromuscular & skeletal: Muscle spasm (3% to 5%), asthenia (3%), neck pain (2%)  <1%, postmarketing, and/or case reports: Acute myocardial infarction, angioedema, cerebrovascular accident, dermatitis, erythema multiforme, femur fracture, hypocalcemia,     Weight gain was not reported.  Injection site reaction and headache were reported. Patient states it is tolerable and we decided to continue with the treatment.  Patient will continue with the same supplements.  I reviewed her labs:  Component      Latest Ref Rng & Units 11/11/2020   Sodium      136 - 145 mmol/L 143   Potassium      3.5 - 5.0 mmol/L 4.6   Chloride      98 - 107 mmol/L 107   CO2      22 - 31 mmol/L 24   Anion Gap, Calculation      5 - 18 mmol/L 12   Glucose      70 - 125 mg/dL 103   Calcium      8.5  - 10.5 mg/dL 10.0   BUN      8 - 22 mg/dL 12   Creatinine      0.60 - 1.10 mg/dL 0.68   GFR MDRD Af Amer      >60 mL/min/1.73m2 >60   GFR MDRD Non Af Amer      >60 mL/min/1.73m2 >60   Calcium, Ionized Measured      1.11 - 1.30 mmol/L 1.27   Calcium, Ionized pH 7.4      1.11 - 1.30 mmol/L 1.17   pH      7.35 - 7.45 7.25 (L)     We will continue Evenity treatment monthly for 12 months.    /73   Pulse 87   Wt 137 lb 1.6 oz (62.2 kg)   SpO2 100%   BMI 24.68 kg/m    15 minutes spent with the patient and more then 50 % of the time in counseling.  This note has been dictated using voice recognition software. Any grammatical or context distortions are unintentional and inherent to the software      Patient Active Problem List   Diagnosis     Osteoporosis     Fracture of wrist, right, closed, initial encounter       Current Outpatient Medications on File Prior to Visit   Medication Sig Dispense Refill     B infantis/B ani/B bairon/B bifid (PROBIOTIC 4X ORAL) Take 1 capsule by mouth daily.       CALCIUM-MAGNESIUM-ZINC ORAL Take 1 tablet by mouth daily. Includes 810 U Vitamin d             cholecalciferol, vitamin D3, (VITAMIN D3 ORAL) Take 2,000 Units by mouth.       ELDERBERRY FRUIT ORAL Take by mouth. Gummie       multivitamin-calcium carb Chew 1 tab(s)       UNABLE TO FIND 3 capsules 3 (three) times a day. Med Name: Balance Of Nature fruit       Current Facility-Administered Medications on File Prior to Visit   Medication Dose Route Frequency Provider Last Rate Last Admin     romosozumab-aqqg injection 210 mg ([facility])  210 mg Subcutaneous Q30 Days Saira Olivas MD   210 mg at 12/16/20 1007

## 2021-06-13 NOTE — PROGRESS NOTES
Assessment/Plan:        1. Other osteoporosis without current pathological fracture  Calcium, Ionized, Measured    Basic Metabolic Panel       Return in about 4 weeks (around 12/9/2020) for Recheck.    Patient Instructions   2nd Evenity today        Chief Complaint   Patient presents with     Osteoporosis Follow Up     Osteo F/U-Evenity       /73   Pulse 78   Wt 134 lb 6.4 oz (61 kg)   SpO2 99%   BMI 24.19 kg/m      Patient is here today for follow up of osteoporosis treatment with Evenity, fist dose was given on 10/15/20. She is traveling to [place] next week, so we moved her appointment for today. No reported side effects. Patient will continue with the same supplements.I will  recheck  calcium level. We will continue Evenity treatment monthly for 12 months.    15 minutes spent with the patient and more then 50 % of the time in counseling.  This note has been dictated using voice recognition software. Any grammatical or context distortions are unintentional and inherent to the software      Patient Active Problem List   Diagnosis     Osteoporosis     Fracture of wrist, right, closed, initial encounter       Current Outpatient Medications on File Prior to Visit   Medication Sig Dispense Refill     cholecalciferol, vitamin D3, (VITAMIN D3 ORAL) Take 2,000 Units by mouth.       ELDERBERRY FRUIT ORAL Take by mouth. Gummie       multivitamin-calcium carb Chew 1 tab(s)       B infantis/B ani/B bairon/B bifid (PROBIOTIC 4X ORAL) Take 1 capsule by mouth daily.       CALCIUM-MAGNESIUM-ZINC ORAL Take 1 tablet by mouth daily. Includes 810 U Vitamin d             Current Facility-Administered Medications on File Prior to Visit   Medication Dose Route Frequency Provider Last Rate Last Dose     romosozumab-aqqg injection 210 mg (EVENITY)  210 mg Subcutaneous Q30 Days Saira Olivas MD   210 mg at 10/15/20 9728

## 2021-06-14 NOTE — PROGRESS NOTES
Assessment/Plan:        1. Other osteoporosis without current pathological fracture         Return in about 4 weeks (around 2/10/2021) for Recheck, osteoporosis.    Patient Instructions   Evenity 4 th today.      Chief Complaint   Patient presents with     Follow-up     F/U for Evenity injection       /60   Pulse 78   Wt 133 lb 8 oz (60.6 kg)   SpO2 98%   BMI 24.03 kg/m      Patient is here today for follow up of osteoporosis treatment with Evenity. Dose # 4 today. No reported big side effects except local reaction in the arms for few days as pain and swelling and headache for first 2-3 days.. Patient will continue with the same supplements.  I will see her in the month.  10 minutes spent with the patient and more then 50 % of the time in counseling.  This note has been dictated using voice recognition software. Any grammatical or context distortions are unintentional and inherent to the software      Patient Active Problem List   Diagnosis     Osteoporosis     Fracture of wrist, right, closed, initial encounter       Current Outpatient Medications on File Prior to Visit   Medication Sig Dispense Refill     B infantis/B ani/B bairon/B bifid (PROBIOTIC 4X ORAL) Take 1 capsule by mouth daily.       CALCIUM-MAGNESIUM-ZINC ORAL Take 1 tablet by mouth daily. Includes 810 U Vitamin d             cholecalciferol, vitamin D3, (VITAMIN D3 ORAL) Take 2,000 Units by mouth.       ELDERBERRY FRUIT ORAL Take by mouth. Gummie       multivitamin-calcium carb Chew 1 tab(s)       UNABLE TO FIND 3 capsules 3 (three) times a day. Med Name: Balance Of Nature fruit       Current Facility-Administered Medications on File Prior to Visit   Medication Dose Route Frequency Provider Last Rate Last Admin     romosozumab-aqqg injection 210 mg (EVENITY)  210 mg Subcutaneous Q30 Days Saira Olivas MD   210 mg at 01/13/21 2697

## 2021-06-15 NOTE — PROGRESS NOTES
Assessment/Plan:        1. Other osteoporosis without current pathological fracture         Total time spent on the date of this encounter doing: chart review, review of test results, patient visit, physical exam, education, counseling, developing plan of care, and documenting =  10   minutes.  Return in about 4 weeks (around 4/8/2021) for Recheck.    Patient Instructions   Evenity # 7 in a month.  Continue same supplements.      Chief Complaint   Patient presents with     Osteoporosis Follow Up     Osteo F/U     51 yo female is here today for follow up of osteoporosis treatment with Evenity. Dose # 6 today. No reported big side effects except local reaction in the arms for few days as pain and swelling and headache for first 2-3 days.. she also noticed neck stiffness and pain that coms and goes. Not sure if related to Evenity.   We discussed all possible side effects. We decided to contonue with Evenity monthly doses for full 12 months.  She was on Prolia prior Evenity and last DXA showed significant decline with T-score -2.8.  Patient will continue with the same supplements.  We also discussed COVID vaccine. I suggested at least 14 days between Evenity and COVID vaccine doses.  I will see her in the month.    /70   Pulse 92   Wt 137 lb 1.6 oz (62.2 kg)   SpO2 99%   BMI 24.68 kg/m      This note has been dictated using voice recognition software. Any grammatical or context distortions are unintentional and inherent to the software      Patient Active Problem List   Diagnosis     Osteoporosis     Fracture of wrist, right, closed, initial encounter       Current Outpatient Medications on File Prior to Visit   Medication Sig Dispense Refill     CALCIUM-MAGNESIUM-ZINC ORAL Take 1 tablet by mouth daily. Includes 810 U Vitamin d             cholecalciferol, vitamin D3, (VITAMIN D3 ORAL) Take 2,000 Units by mouth.       ELDERBERRY FRUIT ORAL Take by mouth. Gummie       multivitamin-calcium carb Chew 1 tab(s)        omeprazole (PRILOSEC) 10 MG capsule Take 20 mg by mouth daily before breakfast.       UNABLE TO FIND 3 capsules 3 (three) times a day. Med Name: Balance Of Nature fruit       B infantis/B ani/B bairon/B bifid (PROBIOTIC 4X ORAL) Take 1 capsule by mouth daily.       Current Facility-Administered Medications on File Prior to Visit   Medication Dose Route Frequency Provider Last Rate Last Admin     romosozumab-aqqg injection 210 mg ([facility]ENITY)  210 mg Subcutaneous Q30 Days Saira Olivas MD   210 mg at 02/10/21 1100

## 2021-06-15 NOTE — PROGRESS NOTES
Assessment/Plan:        1. Other osteoporosis without current pathological fracture         This note has been dictated using voice recognition software. Any grammatical or context distortions are unintentional and inherent to the software.      Return in about 4 weeks (around 3/10/2021) for Recheck.    Patient Instructions   Evenity # 5 today          Subjective:    Coty Cardona is a 50 y.o. female  here for    Chief Complaint   Patient presents with     Osteoporosis Follow Up     Osteo F/U     Patient is here today for follow up of osteoporosis treatment with Evenity. Dose # 5 today. No reported big side effects except local reaction in the arms for few days as pain and swelling and headache for first 2-3 days.. she also noticed neck stiffness and pain that coms and goes. Not sure if related to Evenity.   We discussed all possible side effects. We decided to contonue with Evenity monthly doses for full 12 months.  She was on Prolia prior Evenity and last DXA showed significant decline with T-score -2.8.  Patient will continue with the same supplements.  I will see her in the month.    Social History     Socioeconomic History     Marital status:      Spouse name: Not on file     Number of children: Not on file     Years of education: Not on file     Highest education level: Not on file   Occupational History     Not on file   Social Needs     Financial resource strain: Not on file     Food insecurity     Worry: Not on file     Inability: Not on file     Transportation needs     Medical: Not on file     Non-medical: Not on file   Tobacco Use     Smoking status: Never Smoker     Smokeless tobacco: Never Used   Substance and Sexual Activity     Alcohol use: Yes     Comment: occasionally     Drug use: No     Sexual activity: Not on file   Lifestyle     Physical activity     Days per week: Not on file     Minutes per session: Not on file     Stress: Not on file   Relationships     Social connections      Talks on phone: Not on file     Gets together: Not on file     Attends Religion service: Not on file     Active member of club or organization: Not on file     Attends meetings of clubs or organizations: Not on file     Relationship status: Not on file     Intimate partner violence     Fear of current or ex partner: Not on file     Emotionally abused: Not on file     Physically abused: Not on file     Forced sexual activity: Not on file   Other Topics Concern     Not on file   Social History Narrative     Not on file       Family History   Problem Relation Age of Onset     Breast cancer Mother 50     Cancer Maternal Grandmother         lung     Breast cancer Paternal Grandmother 75     Review of Systems:     A 12 point comprehensive review of systems was negative except as noted in HPI.            Objective:    Physical Exam   BP 98/61   Pulse 82   Wt 135 lb 9.6 oz (61.5 kg)   SpO2 99%   BMI 24.41 kg/m      Constitutional: oriented to person, place, and time, appears well-nourished. No distress.   HENT:   Head: Normocephalic.  Eyes: Conjunctivae are normal.   Neck: Normal range of motion.      Pulmonary/Chest: Effort normal  Musculoskeletal: Normal range of motion.   Neurological: alert and oriented to person, place, and time.  Psychiatric:  normal mood and affect.    Patient Active Problem List   Diagnosis     Osteoporosis     Fracture of wrist, right, closed, initial encounter       Current Outpatient Medications on File Prior to Visit   Medication Sig Dispense Refill     B infantis/B ani/B bairon/B bifid (PROBIOTIC 4X ORAL) Take 1 capsule by mouth daily.       CALCIUM-MAGNESIUM-ZINC ORAL Take 1 tablet by mouth daily. Includes 810 U Vitamin d             cholecalciferol, vitamin D3, (VITAMIN D3 ORAL) Take 2,000 Units by mouth.       ELDERBERRY FRUIT ORAL Take by mouth. Gummie       multivitamin-calcium carb Chew 1 tab(s)       UNABLE TO FIND 3 capsules 3 (three) times a day. Med Name: Balance Of Nature fruit        Current Facility-Administered Medications on File Prior to Visit   Medication Dose Route Frequency Provider Last Rate Last Admin     romosozumab-aqqg injection 210 mg ([facility]ENITY)  210 mg Subcutaneous Q30 Days Saira Olivas MD   210 mg at 01/13/21 1025               Saira Olivas  2/10/2021

## 2021-06-16 PROBLEM — E28.319 PREMATURE MENOPAUSE: Status: ACTIVE | Noted: 2021-04-08

## 2021-06-16 PROBLEM — S62.101A FRACTURE OF WRIST, RIGHT, CLOSED, INITIAL ENCOUNTER: Status: ACTIVE | Noted: 2018-06-28

## 2021-06-16 PROBLEM — Z80.3 FAMILY HISTORY OF MALIGNANT NEOPLASM OF BREAST: Status: ACTIVE | Noted: 2021-04-08

## 2021-06-16 NOTE — PROGRESS NOTES
Assessment/Plan:        1. Other osteoporosis without current pathological fracture  Vitamin D, Total (25-Hydroxy)   2. Weight gain  Thyroid Stimulating Hormone (TSH)    HM2(CBC w/o Differential)    Comprehensive Metabolic Panel   3. Premature menopause     4. Family history of malignant neoplasm of breast       See below.    This note has been dictated using voice recognition software. Any grammatical or context distortions are unintentional and inherent to the software.      Return in about 4 weeks (around 5/6/2021) for Recheck, osteoporosis.    There are no Patient Instructions on file for this visit.        Subjective:    Coty Cardona is a 50 y.o. female  here for    Chief Complaint   Patient presents with     Follow-up     Evenity     49 yo female is here today for follow up of osteoporosis treatment with Evenity. Dose # 7 today. No reported big side effects except local reaction in the arms for few days as pain and swelling and headache for first 2-3 days.. she also noticed significant weight gain over the last 6 months. Not sure if related to Evenity.   We discussed all possible side effects. We looked up UTD and weight gain is not listed and reported.  I will check TSH, CBC, CMP and Vit D today. We talked about referral to weight loss clinic. We decided to contonue with Evenity monthly doses for full 12 months.  She was on Prolia prior Evenity and last DXA showed significant decline with T-score -2.8.  Patient has history of precancerous cells on her ovary and for that reason had hysterectomy and bilateral oophorectomy at age of 36.  She was on HRT just for a year and this therapy was stopped because of the maternal history of breast cancer.  Patient will continue with the same supplements.    Social History     Socioeconomic History     Marital status:      Spouse name: Not on file     Number of children: Not on file     Years of education: Not on file     Highest education level: Not on file    Occupational History     Not on file   Social Needs     Financial resource strain: Not on file     Food insecurity     Worry: Not on file     Inability: Not on file     Transportation needs     Medical: Not on file     Non-medical: Not on file   Tobacco Use     Smoking status: Never Smoker     Smokeless tobacco: Never Used   Substance and Sexual Activity     Alcohol use: Yes     Comment: occasionally     Drug use: No     Sexual activity: Not on file   Lifestyle     Physical activity     Days per week: Not on file     Minutes per session: Not on file     Stress: Not on file   Relationships     Social connections     Talks on phone: Not on file     Gets together: Not on file     Attends Anglican service: Not on file     Active member of club or organization: Not on file     Attends meetings of clubs or organizations: Not on file     Relationship status: Not on file     Intimate partner violence     Fear of current or ex partner: Not on file     Emotionally abused: Not on file     Physically abused: Not on file     Forced sexual activity: Not on file   Other Topics Concern     Not on file   Social History Narrative     Not on file       Family History   Problem Relation Age of Onset     Breast cancer Mother 50     Cancer Maternal Grandmother         lung     Breast cancer Paternal Grandmother 75     Review of Systems:     A 12 point comprehensive review of systems was negative except as noted in HPI.            Objective:    Physical Exam   /80   Pulse 77   Wt 136 lb 14.4 oz (62.1 kg)   SpO2 98%   BMI 24.64 kg/m      Constitutional: oriented to person, place, and time, appears well-nourished. No distress.       Patient Active Problem List   Diagnosis     Osteoporosis     Fracture of wrist, right, closed, initial encounter     Premature menopause     Family history of malignant neoplasm of breast       Current Outpatient Medications on File Prior to Visit   Medication Sig Dispense Refill     B infantis/B  ani/B bairon/B bifid (PROBIOTIC 4X ORAL) Take 1 capsule by mouth daily.       CALCIUM-MAGNESIUM-ZINC ORAL Take 1 tablet by mouth daily. Includes 810 U Vitamin d             cholecalciferol, vitamin D3, (VITAMIN D3 ORAL) Take 2,000 Units by mouth.       ELDERBERRY FRUIT ORAL Take by mouth. Gummie       multivitamin-calcium carb Chew 1 tab(s)       omeprazole (PRILOSEC) 10 MG capsule Take 20 mg by mouth daily before breakfast.       UNABLE TO FIND 3 capsules 3 (three) times a day. Med Name: Balance Of Nature fruit       Current Facility-Administered Medications on File Prior to Visit   Medication Dose Route Frequency Provider Last Rate Last Admin     romosozumab-aqqg injection 210 mg ([facility]ENITY)  210 mg Subcutaneous Q30 Days Saira Olivas MD   105 mg at 04/08/21 0959               Saira Olivas  4/8/2021

## 2021-06-17 NOTE — PROGRESS NOTES
Assessment/Plan:        1. Other osteoporosis without current pathological fracture     2. Elevated liver enzymes  Comprehensive Metabolic Panel    Antinuclear Antibodies Screen (IVETTE)    F-Actin (Smooth Muscle) Antibody, IgG by ISREAL with Reflex to Smooth Muscle Antibody, IgG Titer    Ferritin    Iron and Transferrin Iron Binding Capacity    Alpha-1-Antitrypsin, Blood(AAT)    Ceruloplasmin   3. Nasal bleeding  INR    APTT(PTT)    HM2(CBC w/o Differential)     She is very worried about liver enzyme elevation and is seeing connection between nose bleeds, fatigue and possible liver failure.  We discussed possible etiology of liver enzyme elevation. We will do blake testing today. We will postpone Evenity for tomorrow and will continue with the treatment if liver enzymes stable or lower. If liver enzymes higher, will stop Evenity.   If all labs normal, will see ENT for epistaxis. We discussed mositurizing and home treatment.    This note has been dictated using voice recognition software. Any grammatical or context distortions are unintentional and inherent to the software.      Return in about 4 weeks (around 6/3/2021) for Recheck, osteoporosis.    Patient Instructions   Evenity # 9 in a month.          Subjective:    Coty Cardona is a 50 y.o. female  here for    Chief Complaint   Patient presents with     Osteoporosis Follow Up     Osteo F/U     49 yo female is here today for follow up of osteoporosis treatment with Evenity. Dose # 8 due today. No reported big side effects except local reaction in the arms for few days as pain and swelling and headache for first 2-3 days..   Blood work showed elevated AST 41 and ALT 67 a month ago.Heatitis panel normal. US abdomen normal.  Not sure if related to Evenity.   We discussed all possible side effects. We looked up UTD.  She is tired all the time. She has increased frequency of nasal bleeds, 5 times in the last few weeks. Over the last year noticed frequent nose bleeds.  She is not drinking alcohol and does not take any other medications.  She was on Prolia prior Evenity and last DXA showed significant decline with T-score -2.8.  Patient has history of precancerous cells on her ovary and for that reason had hysterectomy and bilateral oophorectomy at age of 36.  She was on HRT just for a year and this therapy was stopped because of the maternal history of breast cancer.  Patient will continue with the same supplements.  Social History     Socioeconomic History     Marital status:      Spouse name: Not on file     Number of children: Not on file     Years of education: Not on file     Highest education level: Not on file   Occupational History     Not on file   Social Needs     Financial resource strain: Not on file     Food insecurity     Worry: Not on file     Inability: Not on file     Transportation needs     Medical: Not on file     Non-medical: Not on file   Tobacco Use     Smoking status: Never Smoker     Smokeless tobacco: Never Used   Substance and Sexual Activity     Alcohol use: Yes     Comment: occasionally     Drug use: No     Sexual activity: Not on file   Lifestyle     Physical activity     Days per week: Not on file     Minutes per session: Not on file     Stress: Not on file   Relationships     Social connections     Talks on phone: Not on file     Gets together: Not on file     Attends Judaism service: Not on file     Active member of club or organization: Not on file     Attends meetings of clubs or organizations: Not on file     Relationship status: Not on file     Intimate partner violence     Fear of current or ex partner: Not on file     Emotionally abused: Not on file     Physically abused: Not on file     Forced sexual activity: Not on file   Other Topics Concern     Not on file   Social History Narrative     Not on file       Family History   Problem Relation Age of Onset     Breast cancer Mother 50     Cancer Maternal Grandmother         lung      Breast cancer Paternal Grandmother 75     Review of Systems:     A 12 point comprehensive review of systems was negative except as noted in HPI.            Objective:    Physical Exam   /70   Pulse 69   Wt 135 lb 14.4 oz (61.6 kg)   SpO2 99%   BMI 24.46 kg/m      Constitutional: oriented to person, place, and time, appears well-nourished. No distress.   HENT:   Head: Normocephalic.   Eyes: Conjunctivae are normal.   Neck: Normal range of motion.     Pulmonary/Chest: Effort normal   Abdominal: Soft. Bowel sounds are normal. No organomegaly.  Musculoskeletal: Normal range of motion.   Neurological: alert and oriented to person, place, and time.  Psychiatric:  normal mood and affect.    Patient Active Problem List   Diagnosis     Osteoporosis     Fracture of wrist, right, closed, initial encounter     Premature menopause     Family history of malignant neoplasm of breast       Current Outpatient Medications on File Prior to Visit   Medication Sig Dispense Refill     CALCIUM-MAGNESIUM-ZINC ORAL Take 1 tablet by mouth daily. Includes 810 U Vitamin d             cholecalciferol, vitamin D3, (VITAMIN D3 ORAL) Take 2,000 Units by mouth.       ELDERBERRY FRUIT ORAL Take by mouth. Gummie       multivitamin-calcium carb Chew 1 tab(s)       omeprazole (PRILOSEC) 10 MG capsule Take 20 mg by mouth daily before breakfast.       UNABLE TO FIND 3 capsules 3 (three) times a day. Med Name: Balance Of Nature fruit       [DISCONTINUED] B infantis/B ani/B bairon/B bifid (PROBIOTIC 4X ORAL) Take 1 capsule by mouth daily.       Current Facility-Administered Medications on File Prior to Visit   Medication Dose Route Frequency Provider Last Rate Last Admin     romosozumab-aqqg injection 210 mg ([facility]ENITY)  210 mg Subcutaneous Q30 Days Saira Olivas MD   105 mg at 04/08/21 0959               Saira Olivas  5/6/2021

## 2021-06-19 NOTE — PROGRESS NOTES
Dear Dr. Garza ,  Your patient, Coty Cardona was seen today for management of osteoporosis.  The last bone density scan was done in 2015:   The spine bone density L1-L4 with T-score -1.8 and significant improvement of 3.0% comparing to 2014.  Femoral bone densities show left femoral neck T- score -0.9 and right femoral neck T-score -1.3.    Patient was treated in the past with Actonel shortly and then with Prolia 1011-8411.  She was diagnosed with premature osteoporosis, very early because had hysterectomy and bilateral oophorectomy at age of 36 and was on no hormonal therapy just for a year after surgery.    Social history:  reports that she has never smoked. She has never used smokeless tobacco. She reports that she drinks alcohol. She reports that she does not use illicit drugs.    Past medical history:   Patient Active Problem List   Diagnosis     Osteoporosis     Fracture of wrist, right, closed, initial encounter     History of fractures: wrist fracture in Feb 2018.    FH: mom has breast cancer, osteopenia and some bowel disease   Family History   Problem Relation Age of Onset     Breast cancer Mother 50     Cancer Maternal Grandmother      lung     Breast cancer Paternal Grandmother 75       Diet and supplements: Ca 333 mg + vit D 200 IU + 1 dairy serving a day    Risk medications: none    Gynecologic history: Patient has history of precancerous cells on her ovary and for that reason had hysterectomy and bilateral oophorectomy at age of 36.  She was on HRT just for a year and this therapy was stopped because of the maternal history of breast cancer.    Laboratory testing:   Component      Latest Ref Rng & Units 11/1/2016   Sodium      136 - 145 mmol/L 140   Potassium      3.5 - 5.0 mmol/L 4.6   Chloride      98 - 107 mmol/L 105   CO2      22 - 31 mmol/L 26   Anion Gap, Calculation      5 - 18 mmol/L 9   Glucose      70 - 125 mg/dL 89   BUN      8 - 22 mg/dL 12   Creatinine      0.60 - 1.10 mg/dL 0.69  "  GFR MDRD Af Amer      >60 mL/min/1.73m2 >60   GFR MDRD Non Af Amer      >60 mL/min/1.73m2 >60   Bilirubin, Total      0.0 - 1.0 mg/dL 0.4   Calcium      8.5 - 10.5 mg/dL 9.7   Protein, Total      6.0 - 8.0 g/dL 6.9   ALBUMIN      3.5 - 5.0 g/dL 4.5   Alkaline Phosphatase      45 - 120 U/L 112   AST      0 - 40 U/L 28   ALT      0 - 45 U/L 59 (H)   WBC      4.0 - 11.0 thou/uL 5.0   RBC      3.80 - 5.40 mill/uL 4.57   Hemoglobin      12.0 - 16.0 g/dL 13.7   Hematocrit      35.0 - 47.0 % 41.4   MCV      80 - 100 fL 91   MCH      27.0 - 34.0 pg 30.0   MCHC      32.0 - 36.0 g/dL 33.1   RDW      11.0 - 14.5 % 12.5   Platelets      140 - 440 thou/uL 271   MPV      8.5 - 12.5 fL 10.0   Vitamin D, Total (25-Hydroxy)      30.0 - 80.0 ng/mL 33.3   Vitamin B-12      213 - 816 pg/mL 610   TSH      0.30 - 5.00 uIU/mL 2.26       ROS:     Constitutional: Negative.    HENT: Negative.    Eyes: Negative.    Respiratory: Negative.    Cardiovascular: Negative.    Gastrointestinal: Negative.    Endocrine: Negative.    Genitourinary: Negative.    Musculoskeletal: Negative.    Skin: Negative.    Allergic/Immunologic: Negative.    Neurological: Negative.    Hematological: Negative.    Psychiatric/Behavioral: Negative.      PE:  BP 92/62  Pulse 80  Ht 5' 2.5\" (1.588 m)  Wt 125 lb 11.2 oz (57 kg)  BMI 22.62 kg/m2  Constitutional:  oriented to person, place, and time, appears well-nourished. No distress.   HENT:   Head: Normocephalic.   Mouth/Throat: Oropharynx is clear and moist.   Eyes: Conjunctivae are normal. Pupils are equal, round, and reactive to light.   Neck: Normal range of motion. Neck supple.   Cardiovascular: Normal rate, regular rhythm and normal heart sounds.    Pulmonary/Chest: Effort normal and breath sounds normal.  Abdominal: Soft. Bowel sounds are normal.   Musculoskeletal: Normal range of motion.   Neurological: alert and oriented to person, place, and time.   Skin: Skin is warm.   Psychiatric: normal mood and " affect.         Impression:   1. Osteoporosis  DXA Bone Density Scan    HM2(CBC w/o Differential)    Thyroid Stimulating Hormone (TSH)    Vitamin D, Total (25-Hydroxy)    Comprehensive Metabolic Panel    Bone Specific Alkaline Phosphatase    Celiac(Gluten)Antibody Panel ($$$)    Parathyroid Hormone Intact with Minerals    Electrophoresis, Protein, Serum, Cascade    Magnesium   2. Fracture of wrist, right, closed, initial encounter  HM2(CBC w/o Differential)    Thyroid Stimulating Hormone (TSH)    Vitamin D, Total (25-Hydroxy)    Comprehensive Metabolic Panel    Bone Specific Alkaline Phosphatase    Celiac(Gluten)Antibody Panel ($$$)    Parathyroid Hormone Intact with Minerals    Electrophoresis, Protein, Serum, Cascade    Magnesium       Plan:  1. The patient will take 1200 - 1500 mg of calcium daily from the diet and supplements.  2. The patient will take 2000 IU of vit D daily.  3. Treatment options discussed with the patient.  Like to see her labs and bone density scan results first.  I will meet with her again in 2-4 weeks to review all results and decide if she is a candidate for active treatment.  I am concerned about the recent wrist fracture which happened while she was shoveling the snow.  4. I am asking for follow up in 2-4 weeks .    Patient Instructions   Follow up in 2-4 weeks.        Thank you for the opportunity to participate in the care of your patient. If you have any additional questions, do not hesitate to contact me at [facility].    This note has been dictated using voice recognition software. Any grammatical or context distortions are unintentional and inherent to the software      With best personal regards,   Saira Olivas MD, CCD  6/28/2018

## 2021-06-19 NOTE — PROGRESS NOTES
1. Osteoporosis  DXA Bone Density Scan       Return in about 6 months (around 1/31/2019) for Recheck.    Patient Instructions   Prolia 8th today.  Prolia 9th in 6 months with my nurse. I will see you in 1 year.    DXA in 1 year .   Phone number to schedule [phone].    Daily calcium need is 2431-7879 mg a day from the diet and supplements.  Calcium citrate is easier to digest.  Vitamin D 2000 IU daily recommended.      Chief Complaint   Patient presents with     Osteoporosis Follow Up     DXA results       BP 94/60 (Patient Site: Right Arm, Patient Position: Sitting, Cuff Size: Adult Regular)  Pulse 76  Wt 126 lb (57.2 kg)  BMI 22.68 kg/m2      Did you experience any problems with previous Prolia injection? no  Any medication change in the last 6 months? no  Did you take prednisone or other immunosupressant drugs in the last 6 months   (chemo, transplant, rheum, dermatology conditions)? no  Did you have any serious infection in the last 6 months?no  Any recent hospitalizations?no  Do you plan any dental work in the next 2-3 months?no  How much calcium do you take daily from the diet and supplements?1200 mg  How much vit D do you take daily? 2000 IU  Last DXA? Done today, discussed, showed significant decline in the bone density compared to 2015.    All lab results from the previous visit discussed, only vit D slightly lower.      Patient is here today for the 8th Prolia injection. Patient tolerated previous injections well, but stopped the treatment in 2015. She had wrist fracture this last winter. She is waling but not exercising on the regular basis.   We discussed calcium and vit D daily needs today.   Next Prolia injection will be in 6 months.   She had a lot of questions today about appropriate weightbearing exercise and I provided all printed information and we discussed everything.  She also had a question about supplements and side effects of all possible treatment options.  Strongly offered use of  anabolic agent as Forteo or Tymlos considering her young age and very low bone density in her spine but at this point she is not ready to do daily injections.    25 minutes spent with the patient and more then 50 % of the time in counseling.  This note has been dictated using voice recognition software. Any grammatical or context distortions are unintentional and inherent to the software      Patient Active Problem List   Diagnosis     Osteoporosis     Fracture of wrist, right, closed, initial encounter       Current Outpatient Prescriptions on File Prior to Visit   Medication Sig Dispense Refill     B infantis/B ani/B bairon/B bifid (PROBIOTIC 4X ORAL) Take 1 capsule by mouth daily.       CALCIUM-MAGNESIUM-ZINC ORAL Take 1 tablet by mouth daily.       Current Facility-Administered Medications on File Prior to Visit   Medication Dose Route Frequency Provider Last Rate Last Dose     denosumab 60 mg (PROLIA 60 mg/ml)  60 mg Subcutaneous Q6 Months Mera Taylor MD   60 mg at 09/09/15 2319

## 2021-06-23 NOTE — PROGRESS NOTES
1.  When was the last injection?  7/31/18    2.  Has insurance for this injection been verified?  Yes    3.  Did you experience any new onset achiness or rashes that lasted for over a month with your previous Prolia injection?   No    4.  Do you have a fever over 101?F or a new deep cough that is unusual for you today? No    5.  Have you started any new medications in the last 6 months that you were told could affect your immune system? These may have been prescribed by oncologist, transplant, rheumatology, or dermatology.   No    6.  In the last 6 months have you have gastric bypass or parathyroid surgery?   No    7.  Do you plan dental work requiring drilling into the bone such as implants/extractions or oral surgery in the next 2-3 months?   No      The following steps were completed to comply with the REMS program for Prolia:  1. Ordering provider has previously reviewed information in the Medication Guide and Patient Counseling Chart, including the serious risks of Prolia  and the symptoms of each risk and have been advised to seek prompt medical attention if they have signs or symptoms of any of the serious risks.  2. Provided each patient a copy of the Medication Guide and Patient Brochure.  See MAR for administration details.   Indication: Prolia  (denosumab) is a prescription medicine used to treat osteoporosis in patients who:   Are at high risk for fracture, meaning patients who have had a fracture related to osteoporosis, or who have multiple risk factors for fracture; Cannot use another osteoporosis medicine or other osteoporosis medicines did not work well.   The timeline for early/late injections would be 4 weeks early and any time after the 6 month grover. If a patient receives their injection late, then the subsequent injection would be 6 months from the date that they actually received the injection    Have the screening questions been asked prior to this administration? Yes    Patient was wondering  if she should travel in an airplane after getting an injection. I offered her to wait to ask Dr. Olivas but she declined. Injection was given and screening questions were answered.

## 2021-06-26 ENCOUNTER — HEALTH MAINTENANCE LETTER (OUTPATIENT)
Age: 51
End: 2021-06-26

## 2021-06-26 NOTE — PATIENT INSTRUCTIONS - HE
Evenity today.  Please read about Forteo and Tymlos and bisphosphonates.        Treatment Options discussed:   Bisphosphonates  Oral - Alendronate (Fosamax) once weekly for 2-5 years  IV - Zoledronic acid (Reclast) once a year for 3 years total     -Discussed that studies have shown that alendronate for 5 years will protect the bones for an additional 5 years nearly as much as being on alendronate for 10 years straight (only a slight increase in asymptomatic vertebral fractures, no increase in symptomatic fractures).     -discussed studies have shown that three years of zolendronic acid protects for the following 3 years as much as being on zolendronic acid for 6 years straight      GI side effects of the oral bisphosphonates include reflux, esophagitis (inflammation of the esophagus) and ulcers. You should take the medication first thing in the morning, on an empty stomach with a full glass of water and wait 60 minutes to eat.     Flu-like symptoms can occur within the first 24-72 hours of your first infusion of the IV bisphosphonate. This includes low-grade fever, muscle and joint pains. Ibuprofen and/or Tylenol can help these symptoms. Low calcium levels can also occur with the IV bisphosphonates.     Osteonecrosis of the jaw (ONJ) has been rarely associated with bisphosphonate use for osteoporosis.The risk is approximately 1/1700-1/100,000, with development most likely related to invasive dental procedures (extractions, dental implants) and poor dental hygiene. Be sure to continue regular dental visits and alert me and/or your dentist for any issues. If you do require invasive dental work, please contact me and we will stop the medication 3 months prior.      -The data available at this time suggests that there is probably a small increase risk of atypical (nontraumatic) subtrochanteric fractures of the femur in patients on bisphosphonate therapy compared to those not on it. One large study suggested that for  every 100 fractures prevented with bisphosphonate therapy, less than one femur fracture will occur. Other studies suggest one episode per 2,500 patient years. Patient should call with leg pain.     -Calcium: total calcium intake should be 1200mg per day from dietary sources. If not achieved with diet alone, add in calcium tablet(s). No more than 500mg at one time. Dietary sources: 8 ounces of milk, 4 ounces of yogurt or 1 ounce of cheese contain about 300mg calcium per serving, green veggies, almonds, beans, oranges, along with various other plant sources.     -Vitamin D3 recommendations: 2000 IU daily.    -Bone Stimulating Exercise: impact and weight-bearing exercise like walking, jogging, yoga, Alhaji-Chi, stair-climbing, dancing, dry aerobics, and tennis 3-5 times per week for at least 30 minutes & weight-lifting or resistance training 2 times per week, may improve your bone desity and increase your strength, thereby decreasing your risk of fracture.    -Dr. Jeane Chiu has a book on Yoga for Osteoporosis and a 12 pose regimen you can look up online.     -Fall Prevention: avoiding ice, use of Fonk Tracks http://www.[employer]ag/ to cover shoes in the winter, avoiding loose gravel and uneven terrain, clearing house of cords, throw rugs, avoiding ladders, using caution with stairs and around dogs and small children.

## 2021-06-26 NOTE — PROGRESS NOTES
Assessment/Plan:        1. Arthralgia of neck     2. Other osteoporosis without current pathological fracture       We will continue with Evenity monthly but we discussed next step in the treatment, all antiresorptive and anabolic agents.      Total time spent on the date of this encounter doing: chart review, review of test results, patient visit, physical exam, education, counseling, developing plan of care, and documenting =  30   minutes.    This note has been dictated using voice recognition software. Any grammatical or context distortions are unintentional and inherent to the software.      Return in about 4 weeks (around 7/6/2021) for Recheck, osteoporosis.    Patient Instructions   Evenity today.  Please read about Forteo and Tymlos and bisphosphonates.        Treatment Options discussed:   Bisphosphonates  Oral - Alendronate (Fosamax) once weekly for 2-5 years  IV - Zoledronic acid (Reclast) once a year for 3 years total     -Discussed that studies have shown that alendronate for 5 years will protect the bones for an additional 5 years nearly as much as being on alendronate for 10 years straight (only a slight increase in asymptomatic vertebral fractures, no increase in symptomatic fractures).     -discussed studies have shown that three years of zolendronic acid protects for the following 3 years as much as being on zolendronic acid for 6 years straight      GI side effects of the oral bisphosphonates include reflux, esophagitis (inflammation of the esophagus) and ulcers. You should take the medication first thing in the morning, on an empty stomach with a full glass of water and wait 60 minutes to eat.     Flu-like symptoms can occur within the first 24-72 hours of your first infusion of the IV bisphosphonate. This includes low-grade fever, muscle and joint pains. Ibuprofen and/or Tylenol can help these symptoms. Low calcium levels can also occur with the IV bisphosphonates.     Osteonecrosis of the jaw  (ONJ) has been rarely associated with bisphosphonate use for osteoporosis.The risk is approximately 1/1700-1/100,000, with development most likely related to invasive dental procedures (extractions, dental implants) and poor dental hygiene. Be sure to continue regular dental visits and alert me and/or your dentist for any issues. If you do require invasive dental work, please contact me and we will stop the medication 3 months prior.      -The data available at this time suggests that there is probably a small increase risk of atypical (nontraumatic) subtrochanteric fractures of the femur in patients on bisphosphonate therapy compared to those not on it. One large study suggested that for every 100 fractures prevented with bisphosphonate therapy, less than one femur fracture will occur. Other studies suggest one episode per 2,500 patient years. Patient should call with leg pain.     -Calcium: total calcium intake should be 1200mg per day from dietary sources. If not achieved with diet alone, add in calcium tablet(s). No more than 500mg at one time. Dietary sources: 8 ounces of milk, 4 ounces of yogurt or 1 ounce of cheese contain about 300mg calcium per serving, green veggies, almonds, beans, oranges, along with various other plant sources.     -Vitamin D3 recommendations: 2000 IU daily.    -Bone Stimulating Exercise: impact and weight-bearing exercise like walking, jogging, yoga, Alhaji-Chi, stair-climbing, dancing, dry aerobics, and tennis 3-5 times per week for at least 30 minutes & weight-lifting or resistance training 2 times per week, may improve your bone desity and increase your strength, thereby decreasing your risk of fracture.    -Dr. Jeane Chiu has a book on Yoga for Osteoporosis and a 12 pose regimen you can look up online.     -Fall Prevention: avoiding ice, use of Yak Tracks http://www.[employer]/ to cover shoes in the winter, avoiding loose gravel and uneven terrain, clearing house of cords, throw  rugs, avoiding ladders, using caution with stairs and around dogs and small children.             Subjective:    Coty Cardona is a 50 y.o. female  here for    Chief Complaint   Patient presents with     Follow-up     Discuss Evenity- having a lot of back pain, headaches     49 yo female is here today for follow up of osteoporosis treatment with Evenity. Dose # 9 due today. No reported big side effects except neck pain and spasm that can last for few days.   Patient has history of precancerous cells on her ovary and for that reason had hysterectomy and bilateral oophorectomy at age of 36.  She was on HRT just for a year and this therapy was stopped because of the maternal history of breast cancer.  She was on Prolia prior Evenity from 6524-8182 and [id number].  Social History     Socioeconomic History     Marital status:      Spouse name: Not on file     Number of children: Not on file     Years of education: Not on file     Highest education level: Not on file   Occupational History     Not on file   Social Needs     Financial resource strain: Not on file     Food insecurity     Worry: Not on file     Inability: Not on file     Transportation needs     Medical: Not on file     Non-medical: Not on file   Tobacco Use     Smoking status: Never Smoker     Smokeless tobacco: Never Used   Substance and Sexual Activity     Alcohol use: Yes     Comment: occasionally     Drug use: No     Sexual activity: Not on file   Lifestyle     Physical activity     Days per week: Not on file     Minutes per session: Not on file     Stress: Not on file   Relationships     Social connections     Talks on phone: Not on file     Gets together: Not on file     Attends Anabaptism service: Not on file     Active member of club or organization: Not on file     Attends meetings of clubs or organizations: Not on file     Relationship status: Not on file     Intimate partner violence     Fear of current or ex partner: Not on file      "Emotionally abused: Not on file     Physically abused: Not on file     Forced sexual activity: Not on file   Other Topics Concern     Not on file   Social History Narrative     Not on file       Family History   Problem Relation Age of Onset     Breast cancer Mother 50     Cancer Maternal Grandmother         lung     Breast cancer Paternal Grandmother 75     Review of Systems:     A 12 point comprehensive review of systems was negative except as noted in HPI.            Objective:    Physical Exam   /88 (Patient Site: Right Arm, Patient Position: Lying)   Pulse 76   Ht 5' 2.5\" (1.588 m)   Wt 137 lb (62.1 kg)   SpO2 97%   BMI 24.66 kg/m      Constitutional: oriented to person, place, and time, appears well-nourished. No distress.       Patient Active Problem List   Diagnosis     Osteoporosis     Fracture of wrist, right, closed, initial encounter     Premature menopause     Family history of malignant neoplasm of breast       Current Outpatient Medications on File Prior to Visit   Medication Sig Dispense Refill     CALCIUM-MAGNESIUM-ZINC ORAL Take 1 tablet by mouth daily. Includes 810 U Vitamin d             cholecalciferol, vitamin D3, (VITAMIN D3 ORAL) Take 2,000 Units by mouth.       ELDERBERRY FRUIT ORAL Take by mouth. Gummie       multivitamin-calcium carb Chew 1 tab(s)       omeprazole (PRILOSEC) 10 MG capsule Take 20 mg by mouth daily before breakfast.       UNABLE TO FIND 3 capsules 3 (three) times a day. Med Name: Balance Of Nature fruit       Current Facility-Administered Medications on File Prior to Visit   Medication Dose Route Frequency Provider Last Rate Last Admin     romosozumab-aqqg injection 210 mg (EVENITY)  210 mg Subcutaneous Q30 Days Saira Olivas MD   210 mg at 05/07/21 1333               Saira Olivas  6/8/2021  "

## 2021-07-03 NOTE — ADDENDUM NOTE
Addendum Note by Tenisha Henderson CMA at 7/31/2018  3:48 PM     Author: Tenisha Henderson CMA Service: -- Author Type: Certified Medical Assistant    Filed: 7/31/2018  3:48 PM Encounter Date: 7/31/2018 Status: Signed    : Tenisha Henderson CMA (Certified Medical Assistant)    Addended by: TENISHA HENDERSON on: 7/31/2018 03:48 PM        Modules accepted: Orders

## 2021-07-04 NOTE — ADDENDUM NOTE
Addendum Note by Tomas Olivas MD at 4/8/2021  9:40 AM     Author: Tomas Olivas MD Service: -- Author Type: Physician    Filed: 4/9/2021  2:06 PM Encounter Date: 4/8/2021 Status: Signed    : Tomas Olivas MD (Physician)    Addended by: TOMAS OLIVAS on: 4/9/2021 02:06 PM        Modules accepted: Orders

## 2021-07-06 VITALS
SYSTOLIC BLOOD PRESSURE: 120 MMHG | DIASTOLIC BLOOD PRESSURE: 88 MMHG | OXYGEN SATURATION: 97 % | BODY MASS INDEX: 24.27 KG/M2 | HEART RATE: 76 BPM | WEIGHT: 137 LBS | HEIGHT: 63 IN

## 2021-07-13 ENCOUNTER — RECORDS - HEALTHEAST (OUTPATIENT)
Dept: ADMINISTRATIVE | Facility: CLINIC | Age: 51
End: 2021-07-13

## 2021-07-21 ENCOUNTER — RECORDS - HEALTHEAST (OUTPATIENT)
Dept: ADMINISTRATIVE | Facility: CLINIC | Age: 51
End: 2021-07-21

## 2021-08-11 ENCOUNTER — OFFICE VISIT (OUTPATIENT)
Dept: INTERNAL MEDICINE | Facility: CLINIC | Age: 51
End: 2021-08-11
Payer: COMMERCIAL

## 2021-08-11 VITALS
BODY MASS INDEX: 25.02 KG/M2 | HEART RATE: 105 BPM | SYSTOLIC BLOOD PRESSURE: 110 MMHG | WEIGHT: 139 LBS | DIASTOLIC BLOOD PRESSURE: 74 MMHG

## 2021-08-11 DIAGNOSIS — M81.8 IDIOPATHIC OSTEOPOROSIS: ICD-10-CM

## 2021-08-11 DIAGNOSIS — Z92.29 PERSONAL HISTORY OF DRUG THERAPY: Primary | ICD-10-CM

## 2021-08-11 PROCEDURE — 96372 THER/PROPH/DIAG INJ SC/IM: CPT | Performed by: INTERNAL MEDICINE

## 2021-08-11 PROCEDURE — 99213 OFFICE O/P EST LOW 20 MIN: CPT | Mod: 25 | Performed by: INTERNAL MEDICINE

## 2021-08-11 NOTE — PROGRESS NOTES
(M81.0) Osteoporosis  (primary encounter diagnosis)            This note has been dictated using voice recognition software. Any grammatical or context distortions are unintentional and inherent to the software.      Return in about 4 weeks (around 9/8/2021) for Follow up, osteoporosis.    Patient Instructions   Evenity # 11 today.  Evenity # 12 in September.  DXA in October.          Subjective:    Coty Cardona is a 50 year old female  here for follow up.  51 yo female is here today for follow up of osteoporosis treatment with Evenity. Dose # 11 due today. No reported side effects.  Patient has history of precancerous cells on her ovary and for that reason had hysterectomy and bilateral oophorectomy at age of 36.  She was on HRT just for a year and this therapy was stopped because of the maternal history of breast cancer.  She was on Prolia prior Evenity from 9569-4214 and 8844-3259.  Social History     Socioeconomic History     Marital status:      Spouse name: Not on file     Number of children: Not on file     Years of education: Not on file     Highest education level: Not on file   Occupational History     Not on file   Tobacco Use     Smoking status: Never Smoker     Smokeless tobacco: Never Used   Substance and Sexual Activity     Alcohol use: Yes     Comment: Alcoholic Drinks/day: occasionally     Drug use: No     Sexual activity: Not on file   Other Topics Concern     Not on file   Social History Narrative     Not on file     Social Determinants of Health     Financial Resource Strain:      Difficulty of Paying Living Expenses:    Food Insecurity:      Worried About Running Out of Food in the Last Year:      Ran Out of Food in the Last Year:    Transportation Needs:      Lack of Transportation (Medical):      Lack of Transportation (Non-Medical):    Physical Activity:      Days of Exercise per Week:      Minutes of Exercise per Session:    Stress:      Feeling of Stress :    Social  Connections:      Frequency of Communication with Friends and Family:      Frequency of Social Gatherings with Friends and Family:      Attends Shinto Services:      Active Member of Clubs or Organizations:      Attends Club or Organization Meetings:      Marital Status:    Intimate Partner Violence:      Fear of Current or Ex-Partner:      Emotionally Abused:      Physically Abused:      Sexually Abused:        Family History   Problem Relation Age of Onset     Breast Cancer Mother 50.00     Cancer Maternal Grandmother         lung     Breast Cancer Paternal Grandmother 75.00     Review of Systems:  A 12 point comprehensive review of systems was negative except as noted in HPI.        Objective:    Physical Exam   /74   Pulse 105   Wt 63 kg (139 lb)   BMI 25.02 kg/m    Body mass index is 25.02 kg/m .    Constitutional: oriented to person, place, and time, appears well-nourished. No distress.         Patient Active Problem List   Diagnosis     Osteoporosis     Fracture of wrist, right, closed, initial encounter     Premature menopause     Family history of malignant neoplasm of breast       Current Outpatient Medications   Medication     CALCIUM-MAGNESIUM-ZINC ORAL     cholecalciferol, vitamin D3, (VITAMIN D3 ORAL)     ELDERBERRY FRUIT ORAL     multivitamin-calcium carb Chew     UNABLE TO FIND     No current facility-administered medications for this visit.               Saira Olivas MD  8/11/2021

## 2021-09-14 ENCOUNTER — OFFICE VISIT (OUTPATIENT)
Dept: INTERNAL MEDICINE | Facility: CLINIC | Age: 51
End: 2021-09-14
Payer: COMMERCIAL

## 2021-09-14 ENCOUNTER — ANCILLARY PROCEDURE (OUTPATIENT)
Dept: BONE DENSITY | Facility: CLINIC | Age: 51
End: 2021-09-14
Attending: INTERNAL MEDICINE
Payer: COMMERCIAL

## 2021-09-14 VITALS
OXYGEN SATURATION: 98 % | HEART RATE: 73 BPM | BODY MASS INDEX: 24.27 KG/M2 | WEIGHT: 137 LBS | DIASTOLIC BLOOD PRESSURE: 80 MMHG | SYSTOLIC BLOOD PRESSURE: 118 MMHG | HEIGHT: 63 IN

## 2021-09-14 DIAGNOSIS — M81.8 OTHER OSTEOPOROSIS WITHOUT CURRENT PATHOLOGICAL FRACTURE: ICD-10-CM

## 2021-09-14 DIAGNOSIS — M81.6 LOCALIZED OSTEOPOROSIS WITHOUT CURRENT PATHOLOGICAL FRACTURE: Primary | ICD-10-CM

## 2021-09-14 PROCEDURE — 96372 THER/PROPH/DIAG INJ SC/IM: CPT | Performed by: INTERNAL MEDICINE

## 2021-09-14 PROCEDURE — 77080 DXA BONE DENSITY AXIAL: CPT | Mod: TC | Performed by: RADIOLOGY

## 2021-09-14 PROCEDURE — 99213 OFFICE O/P EST LOW 20 MIN: CPT | Mod: 25 | Performed by: INTERNAL MEDICINE

## 2021-09-14 RX ORDER — METHYLPREDNISOLONE SODIUM SUCCINATE 125 MG/2ML
125 INJECTION, POWDER, LYOPHILIZED, FOR SOLUTION INTRAMUSCULAR; INTRAVENOUS
Status: CANCELLED
Start: 2021-09-14

## 2021-09-14 RX ORDER — NALOXONE HYDROCHLORIDE 0.4 MG/ML
0.2 INJECTION, SOLUTION INTRAMUSCULAR; INTRAVENOUS; SUBCUTANEOUS
Status: CANCELLED | OUTPATIENT
Start: 2021-09-14

## 2021-09-14 RX ORDER — ZOLEDRONIC ACID 5 MG/100ML
5 INJECTION, SOLUTION INTRAVENOUS ONCE
Status: CANCELLED
Start: 2021-09-14 | End: 2021-09-14

## 2021-09-14 RX ORDER — HEPARIN SODIUM (PORCINE) LOCK FLUSH IV SOLN 100 UNIT/ML 100 UNIT/ML
5 SOLUTION INTRAVENOUS
Status: CANCELLED | OUTPATIENT
Start: 2021-09-14

## 2021-09-14 RX ORDER — DIPHENHYDRAMINE HYDROCHLORIDE 50 MG/ML
50 INJECTION INTRAMUSCULAR; INTRAVENOUS
Status: CANCELLED
Start: 2021-09-14

## 2021-09-14 RX ORDER — ALBUTEROL SULFATE 0.83 MG/ML
2.5 SOLUTION RESPIRATORY (INHALATION)
Status: CANCELLED | OUTPATIENT
Start: 2021-09-14

## 2021-09-14 RX ORDER — MEPERIDINE HYDROCHLORIDE 25 MG/ML
25 INJECTION INTRAMUSCULAR; INTRAVENOUS; SUBCUTANEOUS EVERY 30 MIN PRN
Status: CANCELLED | OUTPATIENT
Start: 2021-09-14

## 2021-09-14 RX ORDER — EPINEPHRINE 1 MG/ML
0.3 INJECTION, SOLUTION, CONCENTRATE INTRAVENOUS EVERY 5 MIN PRN
Status: CANCELLED | OUTPATIENT
Start: 2021-09-14

## 2021-09-14 RX ORDER — HEPARIN SODIUM,PORCINE 10 UNIT/ML
5 VIAL (ML) INTRAVENOUS
Status: CANCELLED | OUTPATIENT
Start: 2021-09-14

## 2021-09-14 RX ORDER — ALBUTEROL SULFATE 90 UG/1
1-2 AEROSOL, METERED RESPIRATORY (INHALATION)
Status: CANCELLED
Start: 2021-09-14

## 2021-09-14 ASSESSMENT — MIFFLIN-ST. JEOR: SCORE: 1192.62

## 2021-09-14 NOTE — PATIENT INSTRUCTIONS
Evenity # 12 today.    To schedule Reclast infusion end of October.   infusion center.    You will have to have a blood work done 1-2 weeks prior Reclast. I will place the lab orders and you just schedule lab only appointment.    DXA in 1 year.  Continue calcium and vit D.    Treatment Options discussed:   Bisphosphonates  Oral - Alendronate (Fosamax) once weekly for 2-5 years  IV - Zoledronic acid (Reclast) once a year for 3 years total     -Discussed that studies have shown that alendronate for 5 years will protect the bones for an additional 5 years nearly as much as being onalendronate for 10 years straight (only a slight increase in asymptomatic vertebral fractures, no increase in symptomatic fractures).     -discussed studies have shown that three years of zolendronic acid protects forthe following 3 years as much as being on zolendronic acid for 6 years straight      GI side effects of the oral bisphosphonates include reflux, esophagitis (inflammation of the esophagus) and ulcers. You should takethe medication first thing in the morning, on an empty stomach with a full glass of water and wait 60 minutes to eat.     Flu-like symptoms can occur within the first 24-72 hours of your first infusion of the IVbisphosphonate. This includes low-grade fever, muscle and joint pains. Ibuprofen and/or Tylenol can help these symptoms. Low calcium levels can also occur with the IV bisphosphonates.     Osteonecrosis of the jaw(ONJ) has been rarely associated with bisphosphonate use for osteoporosis.The risk is approximately 1/1700-1/100,000, with development most likely related to invasive dental procedures (extractions, dental implants) and poordental hygiene. Be sure to continue regular dental visits and alert me and/or your dentist for any issues. If you do require invasive dental work, please contact me and we will stop the medication 3 months prior.      -The data available at this time suggests that there is probably  a small increase risk of atypical (nontraumatic) subtrochanteric fractures of the femur in patients on bisphosphonate therapy compared to those not onit. One large study suggested that for every 100 fractures prevented with bisphosphonate therapy, less than one femur fracture will occur. Other studies suggest one episode per 2,500 patient years. Patient should call withleg pain.     -Calcium: total calcium intake should be 1200mg per day from dietary sources. If not achieved with diet alone, add in calcium tablet(s). No more than 500mg at one time. Dietary sources: 8 ounces of milk,4 ounces of yogurt or 1 ounce of cheese contain about 300mg calcium per serving, green veggies, almonds, beans, oranges, along with various other plant sources.     -Vitamin D3 recommendations: 2000 IU daily.    -Bone Stimulating Exercise: impact and weight-bearing exercise like walking, jogging, yoga, Alhaji-Chi, stair-climbing, dancing, dry aerobics, and tennis 3-5 times per week for at least 30 minutes & weight-lifting orresistance training 2 times per week, may improve your bone desity and increase your strength, thereby decreasing your risk of fracture.    -Dr. Jeane Chiu has a book on Yoga for Osteoporosis and a 12 pose regimenyou can look up online.     -Fall Prevention: avoiding ice, use of Yak Tracks http://www.[employer]/ to cover shoes in the winter, avoiding loose gravel and uneven terrain, clearing house of cords, throw rugs,avoiding ladders, using caution with stairs and around dogs and small children.

## 2021-09-15 NOTE — PROGRESS NOTES
(M81.6) Localized osteoporosis without current pathological fracture  (primary encounter diagnosis)  .  49 yo female is here today for follow up of osteoporosis treatment with Evenity. Dose # 12 given today. No reported side effects.  Patient has history of precancerous cells on her ovary and for that reason had hysterectomy and bilateral oophorectomy at age of 36.  She was on HRT just for a year and this therapy was stopped because of the maternal history of breast cancer.  She was on Prolia prior Evenity from 1274-2573 and 1540-0306. She was on Actonel very shortly prior Prolia in 2012.  DXA scan done today showed significant improvement of 9.8% in the lumbar spine with L1-L4 T score -2.1.     We are completing today 12 months of treatment with Evenity.  She would like to get J&J Covid vaccine in few weeks.  Few weeks after Covid vaccine, will schedule Reclast infusion. I will see her for the follow up in 1 year.   She will have blood work done 1-2 weeks prior Reclast infusion.  Plan: Comprehensive metabolic panel, Ionized Calcium                  This note has been dictated using voice recognition software. Any grammatical or context distortions are unintentional and inherent to the software.      Return in about 1 year (around 9/14/2022) for Follow up, osteoporosis.    Patient Instructions   Evenity # 12 today.    To schedule Reclast infusion end of October.   infusion center.    You will have to have a blood work done 1-2 weeks prior Reclast. I will place the lab orders and you just schedule lab only appointment.    DXA in 1 year.  Continue calcium and vit D.    Treatment Options discussed:   Bisphosphonates  Oral - Alendronate (Fosamax) once weekly for 2-5 years  IV - Zoledronic acid (Reclast) once a year for 3 years total     -Discussed that studies have shown that alendronate for 5 years will protect the bones for an additional 5 years nearly as much as being onalendronate for 10 years straight (only a  slight increase in asymptomatic vertebral fractures, no increase in symptomatic fractures).     -discussed studies have shown that three years of zolendronic acid protects forthe following 3 years as much as being on zolendronic acid for 6 years straight      GI side effects of the oral bisphosphonates include reflux, esophagitis (inflammation of the esophagus) and ulcers. You should takethe medication first thing in the morning, on an empty stomach with a full glass of water and wait 60 minutes to eat.     Flu-like symptoms can occur within the first 24-72 hours of your first infusion of the IVbisphosphonate. This includes low-grade fever, muscle and joint pains. Ibuprofen and/or Tylenol can help these symptoms. Low calcium levels can also occur with the IV bisphosphonates.     Osteonecrosis of the jaw(ONJ) has been rarely associated with bisphosphonate use for osteoporosis.The risk is approximately 1/1700-1/100,000, with development most likely related to invasive dental procedures (extractions, dental implants) and poordental hygiene. Be sure to continue regular dental visits and alert me and/or your dentist for any issues. If you do require invasive dental work, please contact me and we will stop the medication 3 months prior.      -The data available at this time suggests that there is probably a small increase risk of atypical (nontraumatic) subtrochanteric fractures of the femur in patients on bisphosphonate therapy compared to those not onit. One large study suggested that for every 100 fractures prevented with bisphosphonate therapy, less than one femur fracture will occur. Other studies suggest one episode per 2,500 patient years. Patient should call withleg pain.     -Calcium: total calcium intake should be 1200mg per day from dietary sources. If not achieved with diet alone, add in calcium tablet(s). No more than 500mg at one time. Dietary sources: 8 ounces of milk,4 ounces of yogurt or 1 ounce of cheese  contain about 300mg calcium per serving, green veggies, almonds, beans, oranges, along with various other plant sources.     -Vitamin D3 recommendations: 2000 IU daily.    -Bone Stimulating Exercise: impact and weight-bearing exercise like walking, jogging, yoga, Alhaji-Chi, stair-climbing, dancing, dry aerobics, and tennis 3-5 times per week for at least 30 minutes & weight-lifting orresistance training 2 times per week, may improve your bone desity and increase your strength, thereby decreasing your risk of fracture.    -Dr. Jeane Chiu has a book on Yoga for Osteoporosis and a 12 pose regimenyou can look up online.     -Fall Prevention: avoiding ice, use of Yak Tracks http://www.[employer]/ to cover shoes in the winter, avoiding loose gravel and uneven terrain, clearing house of cords, throw rugs,avoiding ladders, using caution with stairs and around dogs and small children.           Subjective:    Coty Cardona is a 50 year old female  here for follow up.      Social History     Socioeconomic History     Marital status:      Spouse name: Not on file     Number of children: Not on file     Years of education: Not on file     Highest education level: Not on file   Occupational History     Not on file   Tobacco Use     Smoking status: Never Smoker     Smokeless tobacco: Never Used   Substance and Sexual Activity     Alcohol use: Yes     Comment: Alcoholic Drinks/day: occasionally     Drug use: No     Sexual activity: Not on file   Other Topics Concern     Not on file   Social History Narrative     Not on file     Social Determinants of Health     Financial Resource Strain:      Difficulty of Paying Living Expenses:    Food Insecurity:      Worried About Running Out of Food in the Last Year:      Ran Out of Food in the Last Year:    Transportation Needs:      Lack of Transportation (Medical):      Lack of Transportation (Non-Medical):    Physical Activity:      Days of Exercise per Week:      Minutes  "of Exercise per Session:    Stress:      Feeling of Stress :    Social Connections:      Frequency of Communication with Friends and Family:      Frequency of Social Gatherings with Friends and Family:      Attends Voodoo Services:      Active Member of Clubs or Organizations:      Attends Club or Organization Meetings:      Marital Status:    Intimate Partner Violence:      Fear of Current or Ex-Partner:      Emotionally Abused:      Physically Abused:      Sexually Abused:        Family History   Problem Relation Age of Onset     Breast Cancer Mother 50.00     Cancer Maternal Grandmother         lung     Breast Cancer Paternal Grandmother 75.00     Review of Systems:  A 12 point comprehensive review of systems was negative except as noted in HPI.        Objective:    Physical Exam   /80 (BP Location: Right arm, Patient Position: Sitting, Cuff Size: Adult Regular)   Pulse 73   Ht 1.588 m (5' 2.5\")   Wt 62.1 kg (137 lb)   SpO2 98%   BMI 24.66 kg/m    Body mass index is 24.66 kg/m .    Constitutional: oriented to person, place, and time, appears well-nourished. No distress.         Patient Active Problem List   Diagnosis     Osteoporosis     Fracture of wrist, right, closed, initial encounter     Premature menopause     Family history of malignant neoplasm of breast       Current Outpatient Medications   Medication     CALCIUM-MAGNESIUM-ZINC ORAL     cholecalciferol, vitamin D3, (VITAMIN D3 ORAL)     ELDERBERRY FRUIT ORAL     multivitamin-calcium carb Chew     UNABLE TO FIND     No current facility-administered medications for this visit.               Saira Olivas MD  9/14/2021  "

## 2021-10-16 ENCOUNTER — HEALTH MAINTENANCE LETTER (OUTPATIENT)
Age: 51
End: 2021-10-16

## 2021-10-26 ENCOUNTER — LAB (OUTPATIENT)
Dept: LAB | Facility: CLINIC | Age: 51
End: 2021-10-26
Payer: COMMERCIAL

## 2021-10-26 DIAGNOSIS — M81.6 LOCALIZED OSTEOPOROSIS WITHOUT CURRENT PATHOLOGICAL FRACTURE: ICD-10-CM

## 2021-10-26 LAB
ALBUMIN SERPL-MCNC: 4.4 G/DL (ref 3.5–5)
ALP SERPL-CCNC: 100 U/L (ref 45–120)
ALT SERPL W P-5'-P-CCNC: 67 U/L (ref 0–45)
ANION GAP SERPL CALCULATED.3IONS-SCNC: 14 MMOL/L (ref 5–18)
AST SERPL W P-5'-P-CCNC: 46 U/L (ref 0–40)
BILIRUB SERPL-MCNC: 0.3 MG/DL (ref 0–1)
BUN SERPL-MCNC: 9 MG/DL (ref 8–22)
CALCIUM SERPL-MCNC: 10 MG/DL (ref 8.5–10.5)
CALCIUM, IONIZED MEASURED: 1.23 MMOL/L (ref 1.11–1.3)
CHLORIDE BLD-SCNC: 106 MMOL/L (ref 98–107)
CO2 SERPL-SCNC: 24 MMOL/L (ref 22–31)
CREAT SERPL-MCNC: 0.79 MG/DL (ref 0.6–1.1)
GFR SERPL CREATININE-BSD FRML MDRD: 86 ML/MIN/1.73M2
GLUCOSE BLD-MCNC: 90 MG/DL (ref 70–125)
ION CA PH 7.4: 1.17 MMOL/L (ref 1.11–1.3)
PH: 7.31 (ref 7.35–7.45)
POTASSIUM BLD-SCNC: 4 MMOL/L (ref 3.5–5)
PROT SERPL-MCNC: 7.6 G/DL (ref 6–8)
SODIUM SERPL-SCNC: 144 MMOL/L (ref 136–145)

## 2021-10-26 PROCEDURE — 82330 ASSAY OF CALCIUM: CPT

## 2021-10-26 PROCEDURE — 36415 COLL VENOUS BLD VENIPUNCTURE: CPT

## 2021-10-26 PROCEDURE — 80053 COMPREHEN METABOLIC PANEL: CPT

## 2021-10-27 DIAGNOSIS — R94.5 ABNORMAL RESULTS OF LIVER FUNCTION STUDIES: Primary | ICD-10-CM

## 2021-11-10 ENCOUNTER — INFUSION THERAPY VISIT (OUTPATIENT)
Dept: INFUSION THERAPY | Facility: CLINIC | Age: 51
End: 2021-11-10
Attending: INTERNAL MEDICINE
Payer: COMMERCIAL

## 2021-11-10 VITALS
OXYGEN SATURATION: 97 % | RESPIRATION RATE: 16 BRPM | HEART RATE: 75 BPM | SYSTOLIC BLOOD PRESSURE: 113 MMHG | TEMPERATURE: 98.4 F | DIASTOLIC BLOOD PRESSURE: 80 MMHG

## 2021-11-10 DIAGNOSIS — M81.6 LOCALIZED OSTEOPOROSIS WITHOUT CURRENT PATHOLOGICAL FRACTURE: Primary | ICD-10-CM

## 2021-11-10 PROCEDURE — 258N000003 HC RX IP 258 OP 636: Performed by: INTERNAL MEDICINE

## 2021-11-10 PROCEDURE — 250N000011 HC RX IP 250 OP 636: Performed by: INTERNAL MEDICINE

## 2021-11-10 PROCEDURE — 96365 THER/PROPH/DIAG IV INF INIT: CPT

## 2021-11-10 RX ORDER — MEPERIDINE HYDROCHLORIDE 50 MG/ML
25 INJECTION INTRAMUSCULAR; INTRAVENOUS; SUBCUTANEOUS EVERY 30 MIN PRN
Status: CANCELLED | OUTPATIENT
Start: 2021-11-10

## 2021-11-10 RX ORDER — DIPHENHYDRAMINE HYDROCHLORIDE 50 MG/ML
50 INJECTION INTRAMUSCULAR; INTRAVENOUS
Status: DISCONTINUED | OUTPATIENT
Start: 2021-11-10 | End: 2021-11-10 | Stop reason: HOSPADM

## 2021-11-10 RX ORDER — NALOXONE HYDROCHLORIDE 0.4 MG/ML
0.2 INJECTION, SOLUTION INTRAMUSCULAR; INTRAVENOUS; SUBCUTANEOUS
Status: CANCELLED | OUTPATIENT
Start: 2021-11-10

## 2021-11-10 RX ORDER — DIPHENHYDRAMINE HYDROCHLORIDE 50 MG/ML
50 INJECTION INTRAMUSCULAR; INTRAVENOUS
Status: CANCELLED
Start: 2021-11-10

## 2021-11-10 RX ORDER — EPINEPHRINE 1 MG/ML
0.3 INJECTION, SOLUTION INTRAMUSCULAR; SUBCUTANEOUS EVERY 5 MIN PRN
Status: DISCONTINUED | OUTPATIENT
Start: 2021-11-10 | End: 2021-11-10 | Stop reason: HOSPADM

## 2021-11-10 RX ORDER — ZOLEDRONIC ACID 5 MG/100ML
5 INJECTION, SOLUTION INTRAVENOUS ONCE
Status: COMPLETED | OUTPATIENT
Start: 2021-11-10 | End: 2021-11-10

## 2021-11-10 RX ORDER — NALOXONE HYDROCHLORIDE 0.4 MG/ML
0.2 INJECTION, SOLUTION INTRAMUSCULAR; INTRAVENOUS; SUBCUTANEOUS
Status: DISCONTINUED | OUTPATIENT
Start: 2021-11-10 | End: 2021-11-10 | Stop reason: HOSPADM

## 2021-11-10 RX ORDER — METHYLPREDNISOLONE SODIUM SUCCINATE 125 MG/2ML
125 INJECTION, POWDER, LYOPHILIZED, FOR SOLUTION INTRAMUSCULAR; INTRAVENOUS
Status: CANCELLED
Start: 2021-11-10

## 2021-11-10 RX ORDER — ALBUTEROL SULFATE 90 UG/1
1-2 AEROSOL, METERED RESPIRATORY (INHALATION)
Status: CANCELLED
Start: 2021-11-10

## 2021-11-10 RX ORDER — HEPARIN SODIUM (PORCINE) LOCK FLUSH IV SOLN 100 UNIT/ML 100 UNIT/ML
5 SOLUTION INTRAVENOUS
Status: CANCELLED | OUTPATIENT
Start: 2021-11-10

## 2021-11-10 RX ORDER — ALBUTEROL SULFATE 90 UG/1
1-2 AEROSOL, METERED RESPIRATORY (INHALATION)
Status: DISCONTINUED | OUTPATIENT
Start: 2021-11-10 | End: 2021-11-10 | Stop reason: HOSPADM

## 2021-11-10 RX ORDER — METHYLPREDNISOLONE SODIUM SUCCINATE 125 MG/2ML
125 INJECTION, POWDER, LYOPHILIZED, FOR SOLUTION INTRAMUSCULAR; INTRAVENOUS
Status: DISCONTINUED | OUTPATIENT
Start: 2021-11-10 | End: 2021-11-10 | Stop reason: HOSPADM

## 2021-11-10 RX ORDER — MEPERIDINE HYDROCHLORIDE 50 MG/ML
25 INJECTION INTRAMUSCULAR; INTRAVENOUS; SUBCUTANEOUS EVERY 30 MIN PRN
Status: DISCONTINUED | OUTPATIENT
Start: 2021-11-10 | End: 2021-11-10 | Stop reason: HOSPADM

## 2021-11-10 RX ORDER — EPINEPHRINE 1 MG/ML
0.3 INJECTION, SOLUTION INTRAMUSCULAR; SUBCUTANEOUS EVERY 5 MIN PRN
Status: CANCELLED | OUTPATIENT
Start: 2021-11-10

## 2021-11-10 RX ORDER — HEPARIN SODIUM,PORCINE 10 UNIT/ML
5 VIAL (ML) INTRAVENOUS
Status: CANCELLED | OUTPATIENT
Start: 2021-11-10

## 2021-11-10 RX ORDER — ALBUTEROL SULFATE 0.83 MG/ML
2.5 SOLUTION RESPIRATORY (INHALATION)
Status: CANCELLED | OUTPATIENT
Start: 2021-11-10

## 2021-11-10 RX ORDER — ALBUTEROL SULFATE 0.83 MG/ML
2.5 SOLUTION RESPIRATORY (INHALATION)
Status: DISCONTINUED | OUTPATIENT
Start: 2021-11-10 | End: 2021-11-10 | Stop reason: HOSPADM

## 2021-11-10 RX ORDER — ZOLEDRONIC ACID 5 MG/100ML
5 INJECTION, SOLUTION INTRAVENOUS ONCE
Status: CANCELLED
Start: 2021-11-10 | End: 2021-11-10

## 2021-11-10 RX ADMIN — ZOLEDRONIC ACID 5 MG: 5 INJECTION, SOLUTION INTRAVENOUS at 14:53

## 2021-11-10 RX ADMIN — SODIUM CHLORIDE 250 ML: 9 INJECTION, SOLUTION INTRAVENOUS at 14:53

## 2021-11-10 NOTE — PROGRESS NOTES
Infusion Nursing Note:  Coty Cardona presents today for Reclast   Patient seen by provider today: No   present during visit today: Not Applicable.    Note: N/A.      Intravenous Access:  Peripheral IV placed.    Treatment Conditions:  Results reviewed, labs MET treatment parameters, ok to proceed with treatment.      Post Infusion Assessment:  Patient tolerated infusion without incident.       Discharge Plan: Pt given educational printout  information about reclast including side effects        Mar Chamorro RN

## 2021-11-16 ENCOUNTER — ANCILLARY PROCEDURE (OUTPATIENT)
Dept: MAMMOGRAPHY | Facility: CLINIC | Age: 51
End: 2021-11-16
Attending: STUDENT IN AN ORGANIZED HEALTH CARE EDUCATION/TRAINING PROGRAM
Payer: COMMERCIAL

## 2021-11-16 ENCOUNTER — LAB (OUTPATIENT)
Dept: LAB | Facility: CLINIC | Age: 51
End: 2021-11-16
Payer: COMMERCIAL

## 2021-11-16 DIAGNOSIS — R79.89 ABNORMAL LIVER FUNCTION TESTS: Primary | ICD-10-CM

## 2021-11-16 DIAGNOSIS — R94.5 ABNORMAL RESULTS OF LIVER FUNCTION STUDIES: ICD-10-CM

## 2021-11-16 DIAGNOSIS — Z12.31 VISIT FOR SCREENING MAMMOGRAM: ICD-10-CM

## 2021-11-16 LAB
ALBUMIN SERPL-MCNC: 4.4 G/DL (ref 3.5–5)
ALP SERPL-CCNC: 126 U/L (ref 45–120)
ALT SERPL W P-5'-P-CCNC: 70 U/L (ref 0–45)
ANION GAP SERPL CALCULATED.3IONS-SCNC: 10 MMOL/L (ref 5–18)
AST SERPL W P-5'-P-CCNC: 32 U/L (ref 0–40)
BILIRUB SERPL-MCNC: 0.4 MG/DL (ref 0–1)
BUN SERPL-MCNC: 8 MG/DL (ref 8–22)
CALCIUM SERPL-MCNC: 9.4 MG/DL (ref 8.5–10.5)
CHLORIDE BLD-SCNC: 109 MMOL/L (ref 98–107)
CO2 SERPL-SCNC: 24 MMOL/L (ref 22–31)
CREAT SERPL-MCNC: 0.65 MG/DL (ref 0.6–1.1)
GFR SERPL CREATININE-BSD FRML MDRD: >90 ML/MIN/1.73M2
GGT SERPL-CCNC: 97 U/L (ref 0–50)
GLUCOSE BLD-MCNC: 97 MG/DL (ref 70–125)
POTASSIUM BLD-SCNC: 4.6 MMOL/L (ref 3.5–5)
PROT SERPL-MCNC: 7.6 G/DL (ref 6–8)
SODIUM SERPL-SCNC: 143 MMOL/L (ref 136–145)

## 2021-11-16 PROCEDURE — 82977 ASSAY OF GGT: CPT

## 2021-11-16 PROCEDURE — 36415 COLL VENOUS BLD VENIPUNCTURE: CPT

## 2021-11-16 PROCEDURE — 80053 COMPREHEN METABOLIC PANEL: CPT

## 2021-11-16 PROCEDURE — 77063 BREAST TOMOSYNTHESIS BI: CPT

## 2022-01-26 ENCOUNTER — TRANSFERRED RECORDS (OUTPATIENT)
Dept: HEALTH INFORMATION MANAGEMENT | Facility: CLINIC | Age: 52
End: 2022-01-26
Payer: COMMERCIAL

## 2022-04-26 ENCOUNTER — TRANSFERRED RECORDS (OUTPATIENT)
Dept: HEALTH INFORMATION MANAGEMENT | Facility: CLINIC | Age: 52
End: 2022-04-26
Payer: COMMERCIAL

## 2022-07-17 ENCOUNTER — HEALTH MAINTENANCE LETTER (OUTPATIENT)
Age: 52
End: 2022-07-17

## 2022-09-25 ENCOUNTER — HEALTH MAINTENANCE LETTER (OUTPATIENT)
Age: 52
End: 2022-09-25

## 2022-09-26 ENCOUNTER — TELEPHONE (OUTPATIENT)
Dept: INTERNAL MEDICINE | Facility: CLINIC | Age: 52
End: 2022-09-26

## 2022-09-26 DIAGNOSIS — M81.8 IDIOPATHIC OSTEOPOROSIS: Primary | ICD-10-CM

## 2022-09-26 NOTE — TELEPHONE ENCOUNTER
Pt called requesting an order for a DEXA scan, was supposed to recheck in one year. Pt has an appt with Dr. Olivas on 10/13 and wants scan done prior to appointment.     Once order has been placed, please call her and let her know that so she can call and schedule appointment right away. Needs this placed ASAP.

## 2022-10-06 ENCOUNTER — ANCILLARY PROCEDURE (OUTPATIENT)
Dept: BONE DENSITY | Facility: CLINIC | Age: 52
End: 2022-10-06
Attending: INTERNAL MEDICINE
Payer: COMMERCIAL

## 2022-10-06 DIAGNOSIS — M81.8 IDIOPATHIC OSTEOPOROSIS: ICD-10-CM

## 2022-10-06 PROCEDURE — 77080 DXA BONE DENSITY AXIAL: CPT | Mod: TC | Performed by: RADIOLOGY

## 2022-10-13 ENCOUNTER — OFFICE VISIT (OUTPATIENT)
Dept: INTERNAL MEDICINE | Facility: CLINIC | Age: 52
End: 2022-10-13
Payer: COMMERCIAL

## 2022-10-13 VITALS
DIASTOLIC BLOOD PRESSURE: 76 MMHG | HEIGHT: 63 IN | BODY MASS INDEX: 23.57 KG/M2 | HEART RATE: 79 BPM | WEIGHT: 133 LBS | SYSTOLIC BLOOD PRESSURE: 120 MMHG | OXYGEN SATURATION: 98 %

## 2022-10-13 DIAGNOSIS — M81.8 IDIOPATHIC OSTEOPOROSIS: Primary | ICD-10-CM

## 2022-10-13 DIAGNOSIS — S62.101A FRACTURE OF WRIST, RIGHT, CLOSED, INITIAL ENCOUNTER: ICD-10-CM

## 2022-10-13 DIAGNOSIS — E28.319 PREMATURE MENOPAUSE: ICD-10-CM

## 2022-10-13 DIAGNOSIS — Z80.3 FAMILY HISTORY OF MALIGNANT NEOPLASM OF BREAST: ICD-10-CM

## 2022-10-13 PROCEDURE — 99214 OFFICE O/P EST MOD 30 MIN: CPT | Performed by: INTERNAL MEDICINE

## 2022-10-13 RX ORDER — HEPARIN SODIUM (PORCINE) LOCK FLUSH IV SOLN 100 UNIT/ML 100 UNIT/ML
5 SOLUTION INTRAVENOUS
Status: CANCELLED | OUTPATIENT
Start: 2022-10-13

## 2022-10-13 RX ORDER — METHYLDOPA/HYDROCHLOROTHIAZIDE 250MG-15MG
1 TABLET ORAL DAILY
COMMUNITY
End: 2023-10-11

## 2022-10-13 RX ORDER — MEPERIDINE HYDROCHLORIDE 25 MG/ML
25 INJECTION INTRAMUSCULAR; INTRAVENOUS; SUBCUTANEOUS EVERY 30 MIN PRN
Status: CANCELLED | OUTPATIENT
Start: 2022-10-13

## 2022-10-13 RX ORDER — HEPARIN SODIUM,PORCINE 10 UNIT/ML
5 VIAL (ML) INTRAVENOUS
Status: CANCELLED | OUTPATIENT
Start: 2022-10-13

## 2022-10-13 RX ORDER — METHYLPREDNISOLONE SODIUM SUCCINATE 125 MG/2ML
125 INJECTION, POWDER, LYOPHILIZED, FOR SOLUTION INTRAMUSCULAR; INTRAVENOUS
Status: CANCELLED
Start: 2022-10-13

## 2022-10-13 RX ORDER — DIPHENHYDRAMINE HYDROCHLORIDE 50 MG/ML
50 INJECTION INTRAMUSCULAR; INTRAVENOUS
Status: CANCELLED
Start: 2022-10-13

## 2022-10-13 RX ORDER — ACETAMINOPHEN 325 MG/1
325 TABLET ORAL ONCE
Status: CANCELLED | OUTPATIENT
Start: 2022-10-13

## 2022-10-13 RX ORDER — ZOLEDRONIC ACID 5 MG/100ML
5 INJECTION, SOLUTION INTRAVENOUS ONCE
Status: CANCELLED
Start: 2022-10-13

## 2022-10-13 RX ORDER — ZOLEDRONIC ACID 5 MG/100ML
100 INJECTION, SOLUTION INTRAVENOUS ONCE
COMMUNITY
Start: 2021-11-06

## 2022-10-13 RX ORDER — EPINEPHRINE 1 MG/ML
0.3 INJECTION, SOLUTION, CONCENTRATE INTRAVENOUS EVERY 5 MIN PRN
Status: CANCELLED | OUTPATIENT
Start: 2022-10-13

## 2022-10-13 RX ORDER — ALBUTEROL SULFATE 0.83 MG/ML
2.5 SOLUTION RESPIRATORY (INHALATION)
Status: CANCELLED | OUTPATIENT
Start: 2022-10-13

## 2022-10-13 RX ORDER — ALBUTEROL SULFATE 90 UG/1
1-2 AEROSOL, METERED RESPIRATORY (INHALATION)
Status: CANCELLED
Start: 2022-10-13

## 2022-10-13 RX ORDER — MULTIVITAMIN WITH IRON
1 TABLET ORAL DAILY
COMMUNITY
End: 2023-10-11

## 2022-10-13 NOTE — PROGRESS NOTES
Assessment & Plan     Idiopathic osteoporosis    - DX Hip/Pelvis/Spine; Future    Premature menopause      Family history of malignant neoplasm of breast      Fracture of wrist, right, closed, initial encounter      50 yo female is here today for follow up of osteoporosis treatment.   Patient has history of precancerous cells on her ovary and for that reason had hysterectomy and bilateral oophorectomy at age of 36.  She was on HRT just for a year and this therapy was stopped because of the maternal history of breast cancer.  She was on Prolia, prior Evenity, from 6803-2546 and 7855-8282. She was on Actonel very shortly prior Prolia in 2012.  Patient was treated with Evenity for 12 months, 6985-9060.  She had Reclast infusion in 10/2021, which she tolerated well.  DXA scan done 10/6/22 showed significant improvement of 4% in the lumbar spine with L1-L4 T score -1.7. There was also 3.3% decline in the bilat hip BMD, which is expected with the switch from anabolic (Evenity) to antiresorptive agent ( Reclast)..      She will schedule Reclast infusion # 2. I will see her for the follow up in 1 year.   She will have blood work done 1-2 weeks prior Reclast infusion.    She will continue same supplements. She brought them all today and we reviewed them together.        Return in about 1 year (around 10/13/2023) for Follow up, osteoporosis.    Saira Olivas MD  [facility]    Kerri Ansari is a 51 year old, presenting for the following health issues:  Osteoporosis (Osteoporosis follow up, Reclast last Nov 6, 2021, Follow up)      History of Present Illness       Reason for visit:  Oesteoporosis    She eats 2-3 servings of fruits and vegetables daily.She consumes 0 sweetened beverage(s) daily.She exercises with enough effort to increase her heart rate 20 to 29 minutes per day.  She exercises with enough effort to increase her heart rate 5 days per week.   She is taking medications  "regularly.             Review of Systems         Objective    /76 (BP Location: Right arm, Patient Position: Sitting, Cuff Size: Adult Regular)   Pulse 79   Ht 1.588 m (5' 2.5\")   Wt 60.3 kg (133 lb)   SpO2 98%   BMI 23.94 kg/m    Body mass index is 23.94 kg/m .  Physical Exam                       "

## 2022-10-13 NOTE — PATIENT INSTRUCTIONS
infusion center to schedule Reclast infusion.  DXA in 1 year.      Treatment Options discussed:   Bisphosphonates    IV - Zoledronic acid (Reclast) once a year for 3 years total       -discussed studies have shown that three years of zolendronic acid protects forthe following 3 years as much as being on zolendronic acid for 6 years straight           Flu-like symptoms can occur within the first 24-72 hours of your first infusion of the IVbisphosphonate. This includes low-grade fever, muscle and joint pains. Ibuprofen and/or Tylenol can help these symptoms. Low calcium levels can also occur with the IV bisphosphonates.     Osteonecrosis of the jaw(ONJ) has been rarely associated with bisphosphonate use for osteoporosis.The risk is approximately 1/1700-1/100,000, with development most likely related to invasive dental procedures (extractions, dental implants) and poordental hygiene. Be sure to continue regular dental visits and alert me and/or your dentist for any issues. If you do require invasive dental work, please contact me and we will stop the medication 3 months prior.      -The data available at this time suggests that there is probably a small increase risk of atypical (nontraumatic) subtrochanteric fractures of the femur in patients on bisphosphonate therapy compared to those not onit. One large study suggested that for every 100 fractures prevented with bisphosphonate therapy, less than one femur fracture will occur. Other studies suggest one episode per 2,500 patient years. Patient should call withleg pain.     -Calcium: total calcium intake should be 1200mg per day from dietary sources. If not achieved with diet alone, add in calcium tablet(s). No more than 500mg at one time. Dietary sources: 8 ounces of milk,4 ounces of yogurt or 1 ounce of cheese contain about 300mg calcium per serving, green veggies, almonds, beans, oranges, along with various other plant sources.     -Vitamin D3 recommendations:  2000 IU daily.    -Bone Stimulating Exercise: impact and weight-bearing exercise like walking, jogging, yoga, Alhaji-Chi, stair-climbing, dancing, dry aerobics, and tennis 3-5 times per week for at least 30 minutes & weight-lifting orresistance training 2 times per week, may improve your bone desity and increase your strength, thereby decreasing your risk of fracture.    -Dr. Jeane Chiu has a book on Yoga for Osteoporosis and a 12 pose regimenyou can look up online.     -Fall Prevention: avoiding ice, use of [employer]!F[employer] Tracks http://www.[employer]/ to cover shoes in the winter, avoiding loose gravel and uneven terrain, clearing house of cords, throw rugs,avoiding ladders, using caution with stairs and around dogs and small children.

## 2022-11-22 ENCOUNTER — ANCILLARY PROCEDURE (OUTPATIENT)
Dept: MAMMOGRAPHY | Facility: CLINIC | Age: 52
End: 2022-11-22
Attending: OBSTETRICS & GYNECOLOGY
Payer: COMMERCIAL

## 2022-11-22 DIAGNOSIS — Z12.31 VISIT FOR SCREENING MAMMOGRAM: ICD-10-CM

## 2022-11-22 PROCEDURE — 77067 SCR MAMMO BI INCL CAD: CPT

## 2022-12-01 ENCOUNTER — LAB (OUTPATIENT)
Dept: INFUSION THERAPY | Facility: CLINIC | Age: 52
End: 2022-12-01
Attending: INTERNAL MEDICINE
Payer: COMMERCIAL

## 2022-12-01 VITALS
DIASTOLIC BLOOD PRESSURE: 80 MMHG | OXYGEN SATURATION: 97 % | SYSTOLIC BLOOD PRESSURE: 106 MMHG | HEART RATE: 84 BPM | TEMPERATURE: 98.6 F | RESPIRATION RATE: 16 BRPM

## 2022-12-01 DIAGNOSIS — M81.8 IDIOPATHIC OSTEOPOROSIS: Primary | ICD-10-CM

## 2022-12-01 LAB
CALCIUM SERPL-MCNC: 9.4 MG/DL (ref 8.5–10.5)
CREAT SERPL-MCNC: 0.74 MG/DL (ref 0.6–1.1)
GFR SERPL CREATININE-BSD FRML MDRD: >90 ML/MIN/1.73M2

## 2022-12-01 PROCEDURE — 250N000011 HC RX IP 250 OP 636: Performed by: INTERNAL MEDICINE

## 2022-12-01 PROCEDURE — 82565 ASSAY OF CREATININE: CPT | Performed by: INTERNAL MEDICINE

## 2022-12-01 PROCEDURE — 82310 ASSAY OF CALCIUM: CPT | Performed by: INTERNAL MEDICINE

## 2022-12-01 PROCEDURE — 96365 THER/PROPH/DIAG IV INF INIT: CPT

## 2022-12-01 PROCEDURE — 258N000003 HC RX IP 258 OP 636: Performed by: INTERNAL MEDICINE

## 2022-12-01 PROCEDURE — 36415 COLL VENOUS BLD VENIPUNCTURE: CPT | Performed by: INTERNAL MEDICINE

## 2022-12-01 RX ORDER — DIPHENHYDRAMINE HYDROCHLORIDE 50 MG/ML
50 INJECTION INTRAMUSCULAR; INTRAVENOUS
Status: CANCELLED
Start: 2022-12-01

## 2022-12-01 RX ORDER — HEPARIN SODIUM,PORCINE 10 UNIT/ML
5 VIAL (ML) INTRAVENOUS
Status: CANCELLED | OUTPATIENT
Start: 2022-12-01

## 2022-12-01 RX ORDER — ZOLEDRONIC ACID 5 MG/100ML
5 INJECTION, SOLUTION INTRAVENOUS ONCE
Status: COMPLETED | OUTPATIENT
Start: 2022-12-01 | End: 2022-12-01

## 2022-12-01 RX ORDER — ALBUTEROL SULFATE 90 UG/1
1-2 AEROSOL, METERED RESPIRATORY (INHALATION)
Status: CANCELLED
Start: 2022-12-01

## 2022-12-01 RX ORDER — EPINEPHRINE 1 MG/ML
0.3 INJECTION, SOLUTION INTRAMUSCULAR; SUBCUTANEOUS EVERY 5 MIN PRN
Status: CANCELLED | OUTPATIENT
Start: 2022-12-01

## 2022-12-01 RX ORDER — ACETAMINOPHEN 325 MG/1
325 TABLET ORAL ONCE
Status: CANCELLED | OUTPATIENT
Start: 2022-12-01

## 2022-12-01 RX ORDER — ALBUTEROL SULFATE 0.83 MG/ML
2.5 SOLUTION RESPIRATORY (INHALATION)
Status: CANCELLED | OUTPATIENT
Start: 2022-12-01

## 2022-12-01 RX ORDER — MEPERIDINE HYDROCHLORIDE 50 MG/ML
25 INJECTION INTRAMUSCULAR; INTRAVENOUS; SUBCUTANEOUS EVERY 30 MIN PRN
Status: CANCELLED | OUTPATIENT
Start: 2022-12-01

## 2022-12-01 RX ORDER — METHYLPREDNISOLONE SODIUM SUCCINATE 125 MG/2ML
125 INJECTION, POWDER, LYOPHILIZED, FOR SOLUTION INTRAMUSCULAR; INTRAVENOUS
Status: CANCELLED
Start: 2022-12-01

## 2022-12-01 RX ORDER — ACETAMINOPHEN 325 MG/1
325 TABLET ORAL ONCE
Status: DISCONTINUED | OUTPATIENT
Start: 2022-12-01 | End: 2022-12-01 | Stop reason: HOSPADM

## 2022-12-01 RX ORDER — ZOLEDRONIC ACID 5 MG/100ML
5 INJECTION, SOLUTION INTRAVENOUS ONCE
Status: CANCELLED
Start: 2022-12-01

## 2022-12-01 RX ORDER — HEPARIN SODIUM (PORCINE) LOCK FLUSH IV SOLN 100 UNIT/ML 100 UNIT/ML
5 SOLUTION INTRAVENOUS
Status: CANCELLED | OUTPATIENT
Start: 2022-12-01

## 2022-12-01 RX ADMIN — ZOLEDRONIC ACID 5 MG: 0.05 INJECTION, SOLUTION INTRAVENOUS at 14:35

## 2022-12-01 RX ADMIN — SODIUM CHLORIDE 250 ML: 9 INJECTION, SOLUTION INTRAVENOUS at 14:32

## 2022-12-01 NOTE — PROGRESS NOTES
Infusion Nursing Note:  Coty Cardona presents today for yearly Reclast.    Patient seen by provider today: No    Note: Pt arrives ambulatory to [facility]. Pt reports that she doesn't recall how the reclast infusion went last year. Pt reports that her liver function has returned to normal, and her bone strength numbers are improving. Discussed process of today's visit.    + pt preferred taking tylenol from home supply; pt took 500mg.    Intravenous Access:  Peripheral IV placed in right AC.    Treatment Conditions:  Creatinine   Date Value Ref Range Status   12/01/2022 0.74 0.60 - 1.10 mg/dL Final     Calcium 9.4.    Labs within therapy parameters and proceeding with Reclast.    Post Infusion Assessment:  Patient tolerated infusion without incident.    Site patent and intact, free from redness, edema or discomfort.  No evidence of extravasations.  Access discontinued per protocol.     Discharge Plan:   Patient discharged in stable condition accompanied by: self.  Departure Mode: Ambulatory.      Lakeisha Real RN

## 2022-12-09 ENCOUNTER — TELEPHONE (OUTPATIENT)
Dept: INFUSION THERAPY | Facility: CLINIC | Age: 52
End: 2022-12-09

## 2022-12-09 PROBLEM — K21.9 GASTROESOPHAGEAL REFLUX DISEASE WITHOUT ESOPHAGITIS: Status: ACTIVE | Noted: 2022-01-26

## 2022-12-09 NOTE — TELEPHONE ENCOUNTER
"Coty Cardona is a 51 year old female contacted today by phone for a follow-up after their Reclast infusion.     Infusion Date: 12/1/22    Days since infusion: 8     Infusion duration: Scheduled for 30 minutes. Action time per administration report, 39 minutes.    Infusion frequency: Yearly    Subjective/Objective:    Adverse effects:    Have you noticed any adverse effects after your infusion? No     Fatigue: No     Joint pain: No     Muscle pain: No     Bone pain: No\"     Nausea: No      Vomiting: No     Fever: No     Other: no    Have you experienced any of the previous adverse effects from Reclast in the past? No     This is the patient's second Reclast infusion.     Labs:    Creatinine   Date Value Ref Range Status   12/01/2022 0.74 0.60 - 1.10 mg/dL Final     Calcium   Date Value Ref Range Status   12/01/2022 9.4 8.5 - 10.5 mg/dL Final     Phosphorus   Date Value Ref Range Status   06/28/2018 4.0 2.5 - 4.5 mg/dL Final     Magnesium   Date Value Ref Range Status   06/28/2018 2.4 1.8 - 2.6 mg/dL Final     Sodium   Date Value Ref Range Status   11/16/2021 143 136 - 145 mmol/L Final     Potassium   Date Value Ref Range Status   11/16/2021 4.6 3.5 - 5.0 mmol/L Final     Alkaline Phosphatase   Date Value Ref Range Status   11/16/2021 126 (H) 45 - 120 U/L Final     Hemoglobin   Date Value Ref Range Status   05/06/2021 13.9 12.0 - 16.0 g/dL Final     Hematocrit   Date Value Ref Range Status   05/06/2021 42.2 35.0 - 47.0 % Final     Albumin   Date Value Ref Range Status   11/16/2021 4.4 3.5 - 5.0 g/dL Final       Assessment:    Coty has tolerated therapy well as noted above. The patient has received Reclast infusions in the past.     Martin Meese, [facility]            "

## 2022-12-12 ENCOUNTER — OFFICE VISIT (OUTPATIENT)
Dept: FAMILY MEDICINE | Facility: CLINIC | Age: 52
End: 2022-12-12
Payer: COMMERCIAL

## 2022-12-12 VITALS
WEIGHT: 134 LBS | SYSTOLIC BLOOD PRESSURE: 100 MMHG | OXYGEN SATURATION: 98 % | HEIGHT: 62 IN | RESPIRATION RATE: 12 BRPM | BODY MASS INDEX: 24.66 KG/M2 | DIASTOLIC BLOOD PRESSURE: 70 MMHG | TEMPERATURE: 98.6 F | HEART RATE: 76 BPM

## 2022-12-12 DIAGNOSIS — Z00.00 ANNUAL PHYSICAL EXAM: Primary | ICD-10-CM

## 2022-12-12 DIAGNOSIS — R53.83 OTHER FATIGUE: ICD-10-CM

## 2022-12-12 DIAGNOSIS — K21.9 GASTROESOPHAGEAL REFLUX DISEASE WITHOUT ESOPHAGITIS: ICD-10-CM

## 2022-12-12 DIAGNOSIS — M81.6 LOCALIZED OSTEOPOROSIS WITHOUT CURRENT PATHOLOGICAL FRACTURE: ICD-10-CM

## 2022-12-12 DIAGNOSIS — Z12.4 CERVICAL CANCER SCREENING: ICD-10-CM

## 2022-12-12 DIAGNOSIS — Z12.11 SCREEN FOR COLON CANCER: ICD-10-CM

## 2022-12-12 LAB
ERYTHROCYTE [DISTWIDTH] IN BLOOD BY AUTOMATED COUNT: 13.1 % (ref 10–15)
HCT VFR BLD AUTO: 40 % (ref 35–47)
HGB BLD-MCNC: 13.2 G/DL (ref 11.7–15.7)
MCH RBC QN AUTO: 29.3 PG (ref 26.5–33)
MCHC RBC AUTO-ENTMCNC: 33 G/DL (ref 31.5–36.5)
MCV RBC AUTO: 89 FL (ref 78–100)
PLATELET # BLD AUTO: 305 10E3/UL (ref 150–450)
RBC # BLD AUTO: 4.51 10E6/UL (ref 3.8–5.2)
WBC # BLD AUTO: 5.8 10E3/UL (ref 4–11)

## 2022-12-12 PROCEDURE — 85027 COMPLETE CBC AUTOMATED: CPT | Performed by: PHYSICIAN ASSISTANT

## 2022-12-12 PROCEDURE — 83540 ASSAY OF IRON: CPT | Performed by: PHYSICIAN ASSISTANT

## 2022-12-12 PROCEDURE — 99213 OFFICE O/P EST LOW 20 MIN: CPT | Mod: 25 | Performed by: PHYSICIAN ASSISTANT

## 2022-12-12 PROCEDURE — 82728 ASSAY OF FERRITIN: CPT | Performed by: PHYSICIAN ASSISTANT

## 2022-12-12 PROCEDURE — 80053 COMPREHEN METABOLIC PANEL: CPT | Performed by: PHYSICIAN ASSISTANT

## 2022-12-12 PROCEDURE — 99396 PREV VISIT EST AGE 40-64: CPT | Performed by: PHYSICIAN ASSISTANT

## 2022-12-12 PROCEDURE — 80061 LIPID PANEL: CPT | Performed by: PHYSICIAN ASSISTANT

## 2022-12-12 PROCEDURE — 83550 IRON BINDING TEST: CPT | Performed by: PHYSICIAN ASSISTANT

## 2022-12-12 PROCEDURE — 36415 COLL VENOUS BLD VENIPUNCTURE: CPT | Performed by: PHYSICIAN ASSISTANT

## 2022-12-12 ASSESSMENT — ENCOUNTER SYMPTOMS
NAUSEA: 0
CONSTIPATION: 0
ARTHRALGIAS: 0
HEMATOCHEZIA: 0
WEAKNESS: 0
FEVER: 0
DYSURIA: 0
HEMATURIA: 0
EYE PAIN: 0
PARESTHESIAS: 0
FREQUENCY: 1
NERVOUS/ANXIOUS: 1
HEADACHES: 0
BREAST MASS: 0
ABDOMINAL PAIN: 0
SORE THROAT: 0
SHORTNESS OF BREATH: 0
CHILLS: 0
HEARTBURN: 1
COUGH: 0
DIARRHEA: 0
PALPITATIONS: 0
JOINT SWELLING: 0
MYALGIAS: 0
DIZZINESS: 0

## 2022-12-12 NOTE — PROGRESS NOTES
SUBJECTIVE:   CC: Coty is an 51 year old who presents for preventive health visit.     Patient has been advised of split billing requirements and indicates understanding: Yes  The patient is a pleasant 51-year-old female that presents to the clinic today for a annual physical.  Past medical history significant for osteoporosis, GERD, and history of a hysterectomy due to precancerous ovarian tumor.      She does have a history of osteoporosis currently on calcium, vitamin D, and magnesium supplements.  She also currently is on Reclast.  She started this 10/2021.  Previously she was on Prolia from 2012 2015 and then again from 20 18-20 20.  She was then switched to Evenity from 20 20-20 21 but had significant side effects and now on Reclast.  Last DEXA scan 10 6/2022 did show a 4% increase in her bone density.  Osteoporotic risk fracture.  Vitamin D level 4/2021 was 34.  She just recently underwent her second Reclast calcium level was checked prior and things are going well.    Also been having some fatigue for about 2 years.  She states that she does snore at times.  She is very tired towards the end of the evening.  She feels that her mood is doing quite well.    She was on omeprazole in the past for reflux but she likes to try herbal supplements to help with this.  She is not currently taking anything.    Healthy Habits:     Getting at least 3 servings of Calcium per day:  Yes    Bi-annual eye exam:  Yes    Dental care twice a year:  Yes    Sleep apnea or symptoms of sleep apnea:  Daytime drowsiness    Diet:  Regular (no restrictions)    Frequency of exercise:  2-3 days/week    Duration of exercise:  30-45 minutes    Taking medications regularly:  Yes    Medication side effects:  None    PHQ-2 Total Score: 2    Additional concerns today:  No        Today's PHQ-2 Score:   PHQ-2 ( 1999 [employer]) 12/12/2022   Q1: Little interest or pleasure in doing things 1   Q2: Feeling down, depressed or hopeless 1   PHQ-2 Score  2   PHQ-2 Total Score (12-17 Years)- Positive if 3 or more points; Administer PHQ-A if positive -   Q1: Little interest or pleasure in doing things Several days   Q2: Feeling down, depressed or hopeless Several days   PHQ-2 Score 2       Have you ever done Advance Care Planning? (For example, a Health Directive, POLST, or a discussion with a medical provider or your loved ones about your wishes): No, advance care planning information given to patient to review.  Patient declined advance care planning discussion at this time.    Social History     Tobacco Use     Smoking status: Never     Passive exposure: Never     Smokeless tobacco: Never   Substance Use Topics     Alcohol use: Yes     Comment: Alcoholic Drinks/day: occasionally     If you drink alcohol do you typically have >3 drinks per day or >7 drinks per week? No    Alcohol Use 12/12/2022   Prescreen: >3 drinks/day or >7 drinks/week? No   Prescreen: >3 drinks/day or >7 drinks/week? -       Reviewed orders with patient.  Reviewed health maintenance and updated orders accordingly - Yes  Lab work is in process    Breast Cancer Screening:    FHS-7:   Breast CA Risk Assessment (FHS-7) 11/16/2021 11/22/2022 12/12/2022   Did any of your first-degree relatives have breast or ovarian cancer? Yes Yes Yes   Did any of your relatives have bilateral breast cancer? No No Yes   Did any man in your family have breast cancer? No No No   Did any woman in your family have breast and ovarian cancer? Yes No Yes   Did any woman in your family have breast cancer before age 50 y? No No Yes   Do you have 2 or more relatives with breast and/or ovarian cancer? No No No   Do you have 2 or more relatives with breast and/or bowel cancer? No No No     click delete button to remove this line now  Mammogram Screening: Recommended mammography every 1-2 years with patient discussion and risk factor consideration  Pertinent mammograms are reviewed under the imaging tab.    History of abnormal  "Pap smear: Status post hysterectomy. Pap still indicated.      Reviewed and updated as needed this visit by clinical staff    Allergies  Meds              Reviewed and updated as needed this visit by Provider                 No past medical history on file.   Past Surgical History:   Procedure Laterality Date     CHOLECYSTECTOMY       HYSTERECTOMY      age 36     MAMMOPLASTY REDUCTION Bilateral     age 33     OOPHORECTOMY       OTHER SURGICAL HISTORY      right knee surgery       Review of Systems   Constitutional: Negative for chills and fever.   HENT: Negative for congestion, ear pain, hearing loss and sore throat.    Eyes: Negative for pain and visual disturbance.   Respiratory: Negative for cough and shortness of breath.    Cardiovascular: Negative for chest pain, palpitations and peripheral edema.   Gastrointestinal: Positive for heartburn. Negative for abdominal pain, constipation, diarrhea, hematochezia and nausea.   Breasts:  Negative for tenderness, breast mass and discharge.   Genitourinary: Positive for frequency. Negative for dysuria, genital sores, hematuria, pelvic pain, urgency, vaginal bleeding and vaginal discharge.   Musculoskeletal: Negative for arthralgias, joint swelling and myalgias.   Skin: Negative for rash.   Neurological: Negative for dizziness, weakness, headaches and paresthesias.   Psychiatric/Behavioral: Negative for mood changes. The patient is nervous/anxious.           OBJECTIVE:   /70 (BP Location: Left arm, Patient Position: Sitting, Cuff Size: Adult Regular)   Pulse 76   Temp 98.6  F (37  C) (Oral)   Resp 12   Ht 1.582 m (5' 2.3\")   Wt 60.8 kg (134 lb)   LMP  (LMP Unknown)   SpO2 98%   BMI 24.27 kg/m    Physical Exam  Vitals and nursing note reviewed.   Constitutional:       General: She is not in acute distress.     Appearance: Normal appearance. She is not ill-appearing, toxic-appearing or diaphoretic.   HENT:      Head: Normocephalic and atraumatic.      Right " Ear: Tympanic membrane, ear canal and external ear normal.      Left Ear: Tympanic membrane, ear canal and external ear normal.   Eyes:      General:         Right eye: No discharge.         Left eye: No discharge.      Conjunctiva/sclera: Conjunctivae normal.      Pupils: Pupils are equal, round, and reactive to light.   Cardiovascular:      Rate and Rhythm: Normal rate and regular rhythm.      Heart sounds: No murmur heard.    No friction rub. No gallop.   Pulmonary:      Effort: Pulmonary effort is normal.      Breath sounds: No wheezing, rhonchi or rales.   Abdominal:      General: Abdomen is flat.      Tenderness: There is no guarding or rebound.   Skin:     General: Skin is warm and dry.      Findings: No lesion or rash.   Neurological:      General: No focal deficit present.      Mental Status: She is alert and oriented to person, place, and time. Mental status is at baseline.      Motor: No weakness.      Gait: Gait normal.   Psychiatric:         Mood and Affect: Mood normal.         Behavior: Behavior normal.         Thought Content: Thought content normal.         Judgment: Judgment normal.           ASSESSMENT/PLAN:       ICD-10-CM    1. Annual physical exam  Z00.00 CBC with platelets     Lipid panel reflex to direct LDL Fasting     Comprehensive metabolic panel (BMP + Alb, Alk Phos, ALT, AST, Total. Bili, TP)     CBC with platelets     Lipid panel reflex to direct LDL Fasting     Comprehensive metabolic panel (BMP + Alb, Alk Phos, ALT, AST, Total. Bili, TP)      2. Gastroesophageal reflux disease without esophagitis  K21.9       3. Localized osteoporosis without current pathological fracture  M81.6       4. Screen for colon cancer  Z12.11       5. Cervical cancer screening  Z12.4       6. Other fatigue  R53.83 Iron and iron binding capacity     Ferritin     Iron and iron binding capacity     Ferritin        #1 annual physical-we will update screening labs including a CBC, CMP, lipid, iron and ferritin  level.    #2 osteoporosis-currently on Reclast as of 10/2021.  She is tolerating this well.  Most recent creatinine and calcium level were stable.  Last DEXA scan done 10/2022.    #3 GERD-she was on omeprazole in the past but she tends to like more herbal and natural supplements which she is trying.  She feels that staying well-hydrated to help with her reflux as well.    #4 elevated liver enzymes-she states that she does have elevated liver enzymes in the past.  She feels that it is from the Evenity that she was on previously.  She did see GI in the past and we are now just continuing to monitor her liver function.  We will check this today    #5 adjustment disorder-she has been under a lot of stress as she has lost some family members most recently.  She lost a sister-in-law to ovarian cancer and is still grieving.  Recommended to continue to monitor this and counseling could be helpful if she feels this would be beneficial.    #6 history of precancerous ovarian tumor-underwent a hysterectomy at 36.    #7 fatigue-we will check hemoglobin and iron levels.  Less likely think obstructive sleep apnea but could check this down the road if symptoms continue.  I did recommend trialing to add in 325 mg of iron to see if this helps.  Constipation side effect was discussed    #8 colon cancer screening-she did have a colonoscopy 3 years ago.  She states that she was given 10-year recommendation.  We will call [facility] and get her results    #9 breast cancer [id number]-date last mammogram 11/20/2022    #10 cervical cancer screening-she did undergo hysterectomy and oophorectomy no further Paps are needed    #11 immunizations-she does not wish to update COVID or influenza today.  She will think about doing the shingles.        Patient has been advised of split billing requirements and indicates understanding: Yes      COUNSELING:  Reviewed preventive health counseling, as reflected in patient instructions       Regular  exercise       Healthy diet/nutrition       Vision screening       Colorectal Cancer Screening        She reports that she has never smoked. She has never been exposed to tobacco smoke. She has never used smokeless tobacco.          Chilo Rao PA-C  [facility]

## 2022-12-13 LAB
ALBUMIN SERPL BCG-MCNC: 4.8 G/DL (ref 3.5–5.2)
ALP SERPL-CCNC: 92 U/L (ref 35–104)
ALT SERPL W P-5'-P-CCNC: 38 U/L (ref 10–35)
ANION GAP SERPL CALCULATED.3IONS-SCNC: 12 MMOL/L (ref 7–15)
AST SERPL W P-5'-P-CCNC: 26 U/L (ref 10–35)
BILIRUB SERPL-MCNC: 0.2 MG/DL
BUN SERPL-MCNC: 14 MG/DL (ref 6–20)
CALCIUM SERPL-MCNC: 9.1 MG/DL (ref 8.6–10)
CHLORIDE SERPL-SCNC: 105 MMOL/L (ref 98–107)
CHOLEST SERPL-MCNC: 289 MG/DL
CREAT SERPL-MCNC: 0.64 MG/DL (ref 0.51–0.95)
DEPRECATED HCO3 PLAS-SCNC: 24 MMOL/L (ref 22–29)
FERRITIN SERPL-MCNC: 74 NG/ML (ref 11–328)
GFR SERPL CREATININE-BSD FRML MDRD: >90 ML/MIN/1.73M2
GLUCOSE SERPL-MCNC: 80 MG/DL (ref 70–99)
HDLC SERPL-MCNC: 50 MG/DL
IRON BINDING CAPACITY (ROCHE): 327 UG/DL (ref 240–430)
IRON SATN MFR SERPL: 20 % (ref 15–46)
IRON SERPL-MCNC: 66 UG/DL (ref 37–145)
LDLC SERPL CALC-MCNC: 169 MG/DL
NONHDLC SERPL-MCNC: 239 MG/DL
POTASSIUM SERPL-SCNC: 4.4 MMOL/L (ref 3.4–5.3)
PROT SERPL-MCNC: 7.4 G/DL (ref 6.4–8.3)
SODIUM SERPL-SCNC: 141 MMOL/L (ref 136–145)
TRIGL SERPL-MCNC: 349 MG/DL

## 2023-10-11 ENCOUNTER — OFFICE VISIT (OUTPATIENT)
Dept: INTERNAL MEDICINE | Facility: CLINIC | Age: 53
End: 2023-10-11
Payer: COMMERCIAL

## 2023-10-11 ENCOUNTER — ANCILLARY PROCEDURE (OUTPATIENT)
Dept: BONE DENSITY | Facility: CLINIC | Age: 53
End: 2023-10-11
Attending: INTERNAL MEDICINE
Payer: COMMERCIAL

## 2023-10-11 VITALS
BODY MASS INDEX: 25.88 KG/M2 | DIASTOLIC BLOOD PRESSURE: 60 MMHG | HEART RATE: 76 BPM | SYSTOLIC BLOOD PRESSURE: 100 MMHG | WEIGHT: 140.6 LBS | HEIGHT: 62 IN | OXYGEN SATURATION: 98 % | RESPIRATION RATE: 16 BRPM

## 2023-10-11 DIAGNOSIS — M81.8 IDIOPATHIC OSTEOPOROSIS: ICD-10-CM

## 2023-10-11 DIAGNOSIS — M81.8 IDIOPATHIC OSTEOPOROSIS: Primary | ICD-10-CM

## 2023-10-11 PROCEDURE — 77080 DXA BONE DENSITY AXIAL: CPT | Mod: TC

## 2023-10-11 PROCEDURE — 99213 OFFICE O/P EST LOW 20 MIN: CPT | Performed by: INTERNAL MEDICINE

## 2023-10-11 RX ORDER — OMEGA-3/DHA/EPA/FISH OIL 60 MG-90MG
1400 CAPSULE ORAL DAILY
COMMUNITY

## 2023-10-11 RX ORDER — METHYLPREDNISOLONE SODIUM SUCCINATE 125 MG/2ML
125 INJECTION, POWDER, LYOPHILIZED, FOR SOLUTION INTRAMUSCULAR; INTRAVENOUS
Status: CANCELLED
Start: 2023-10-18

## 2023-10-11 RX ORDER — ACETAMINOPHEN 325 MG/1
650 TABLET ORAL
Status: CANCELLED | OUTPATIENT
Start: 2023-10-18

## 2023-10-11 RX ORDER — MULTIVIT,TX WITH IRON,MINERALS
500 TABLET, EXTENDED RELEASE ORAL 2 TIMES DAILY
COMMUNITY

## 2023-10-11 RX ORDER — ALBUTEROL SULFATE 90 UG/1
1-2 AEROSOL, METERED RESPIRATORY (INHALATION)
Status: CANCELLED
Start: 2023-10-18

## 2023-10-11 RX ORDER — MEPERIDINE HYDROCHLORIDE 25 MG/ML
25 INJECTION INTRAMUSCULAR; INTRAVENOUS; SUBCUTANEOUS EVERY 30 MIN PRN
Status: CANCELLED | OUTPATIENT
Start: 2023-10-18

## 2023-10-11 RX ORDER — EPINEPHRINE 1 MG/ML
0.3 INJECTION, SOLUTION, CONCENTRATE INTRAVENOUS EVERY 5 MIN PRN
Status: CANCELLED | OUTPATIENT
Start: 2023-10-18

## 2023-10-11 RX ORDER — IBUPROFEN 100 %
POWDER (GRAM) MISCELLANEOUS
COMMUNITY
End: 2023-10-11

## 2023-10-11 RX ORDER — ALBUTEROL SULFATE 0.83 MG/ML
2.5 SOLUTION RESPIRATORY (INHALATION)
Status: CANCELLED | OUTPATIENT
Start: 2023-10-18

## 2023-10-11 RX ORDER — ZOLEDRONIC ACID 5 MG/100ML
5 INJECTION, SOLUTION INTRAVENOUS ONCE
Status: CANCELLED
Start: 2023-10-18

## 2023-10-11 RX ORDER — DIPHENHYDRAMINE HYDROCHLORIDE 50 MG/ML
50 INJECTION INTRAMUSCULAR; INTRAVENOUS
Status: CANCELLED
Start: 2023-10-18

## 2023-10-11 RX ORDER — HEPARIN SODIUM,PORCINE 10 UNIT/ML
5-20 VIAL (ML) INTRAVENOUS DAILY PRN
Status: CANCELLED | OUTPATIENT
Start: 2023-10-18

## 2023-10-11 RX ORDER — HEPARIN SODIUM (PORCINE) LOCK FLUSH IV SOLN 100 UNIT/ML 100 UNIT/ML
5 SOLUTION INTRAVENOUS
Status: CANCELLED | OUTPATIENT
Start: 2023-10-18

## 2023-10-11 NOTE — PROGRESS NOTES
"  Assessment & Plan     Idiopathic osteoporosis, premature menopause and h/o wrist fracture  51 yo female is here today for follow up of osteoporosis treatment.   Patient has history of precancerous cells on her ovary and for that reason had hysterectomy and bilateral oophorectomy at age of 36.  She was on HRT just for a year and this therapy was stopped because of the maternal history of breast cancer.  She was on Prolia, prior Evenity, from 3161-7102 and 9574-8485. She was on Actonel very shortly prior Prolia in 2012.  Patient was treated with Evenity for 12 months, 2006-6080.  She had Reclast infusion in 10/2021 and 12/2022, which she tolerated well.  DXA scan done today showed decline of 5.4% in the lumbar spine with L1-L4 T score -2.2 compared to DXA done last year.    She will schedule Reclast infusion # 3 and will start weight bearin exercise like Pilates 2-3x/week. I will see her for the follow up in 1 year.   She will have blood work done 1-2 weeks prior Reclast infusion.  - DX Hip/Pelvis/Spine; Future             BMI:   Estimated body mass index is 25.47 kg/m  as calculated from the following:    Height as of this encounter: 1.582 m (5' 2.3\").    Weight as of this encounter: 63.8 kg (140 lb 9.6 oz).     Patient Instructions   You can schedule Reclast infusion at  infusion center in December.  DXA in 1 year.    Treatment Options discussed:   Bisphosphonates    IV - Zoledronic acid (Reclast) once a year for 3 years total       -discussed studies have shown that three years of zolendronic acid protects forthe following 3 years as much as being on zolendronic acid for 6 years straight     Flu-like symptoms can occur within the first 24-72 hours of your first infusion of the IVbisphosphonate. This includes low-grade fever, muscle and joint pains. Ibuprofen and/or Tylenol can help these symptoms. Low calcium levels can also occur with the IV bisphosphonates.     Osteonecrosis of the jaw(ONJ) has been rarely " associated with bisphosphonate use for osteoporosis.The risk is approximately 1/1700-1/100,000, with development most likely related to invasive dental procedures (extractions, dental implants) and poordental hygiene. Be sure to continue regular dental visits and alert me and/or your dentist for any issues. If you do require invasive dental work, please contact me and we will stop the medication 3 months prior.      -The data available at this time suggests that there is probably a small increase risk of atypical (nontraumatic) subtrochanteric fractures of the femur in patients on bisphosphonate therapy compared to those not onit. One large study suggested that for every 100 fractures prevented with bisphosphonate therapy, less than one femur fracture will occur. Other studies suggest one episode per 2,500 patient years. Patient should call withleg pain.     -Calcium: total calcium intake should be 1200mg per day from dietary sources. If not achieved with diet alone, add in calcium tablet(s). No more than 500mg at one time. Dietary sources: 8 ounces of milk,4 ounces of yogurt or 1 ounce of cheese contain about 300mg calcium per serving, green veggies, almonds, beans, oranges, along with various other plant sources.     -Vitamin D3 recommendations: 2000 IU daily.    -Bone Stimulating Exercise: impact and weight-bearing exercise like walking, jogging, yoga, Alhaji-Chi, stair-climbing, dancing, dry aerobics, and tennis 3-5 times per week for at least 30 minutes & weight-lifting orresistance training 2 times per week, may improve your bone desity and increase your strength, thereby decreasing your risk of fracture.    -Dr. Jeane Chiu has a book on Yoga for Osteoporosis and a 12 pose regimenyou can look up online.     -Fall Prevention: avoiding ice, use of BeFunkyk Tracks http://www.[employer]fitter/ to cover shoes in the winter, avoiding loose gravel and uneven terrain, clearing house of cords, throw rugs,avoiding ladders, using  "caution with stairs and around dogs and small children.        Return in about 1 year (around 10/11/2024) for Follow up, osteoporosis.     Saira Olivas MD  [facility]    Kerri Ansari is a 52 year old, presenting for the following health issues:  Osteoporosis (Osteo F/U)      10/11/2023     7:44 AM   Additional Questions   Roomed by Baylee SHARP               Review of Systems         Objective    /60   Pulse 76   Resp 16   Ht 1.582 m (5' 2.3\")   Wt 63.8 kg (140 lb 9.6 oz)   LMP  (LMP Unknown)   SpO2 98%   BMI 25.47 kg/m    Body mass index is 25.47 kg/m .  Physical Exam                         Answers submitted by the patient for this visit:  General Questionnaire (Submitted on 10/4/2023)  Chief Complaint: Chronic problems general questions HPI Form  What is the reason for your visit today? : Oesteoporosis  How many servings of fruits and vegetables do you eat daily?: 4 or more  On average, how many sweetened beverages do you drink each day (Examples: soda, juice, sweet tea, etc.  Do NOT count diet or artificially sweetened beverages)?: 1  How many minutes a day do you exercise enough to make your heart beat faster?: 20 to 29  How many days a week do you exercise enough to make your heart beat faster?: 4  How many days per week do you miss taking your medication?: 2  What makes it hard for you to take your medication every day?: remembering to take    "

## 2023-10-11 NOTE — PATIENT INSTRUCTIONS
You can schedule Reclast infusion at [facility] center in December.  DXA in 1 year.    Treatment Options discussed:   Bisphosphonates    IV - Zoledronic acid (Reclast) once a year for 3 years total       -discussed studies have shown that three years of zolendronic acid protects forthe following 3 years as much as being on zolendronic acid for 6 years straight     Flu-like symptoms can occur within the first 24-72 hours of your first infusion of the IVbisphosphonate. This includes low-grade fever, muscle and joint pains. Ibuprofen and/or Tylenol can help these symptoms. Low calcium levels can also occur with the IV bisphosphonates.     Osteonecrosis of the jaw(ONJ) has been rarely associated with bisphosphonate use for osteoporosis.The risk is approximately 1/1700-1/100,000, with development most likely related to invasive dental procedures (extractions, dental implants) and poordental hygiene. Be sure to continue regular dental visits and alert me and/or your dentist for any issues. If you do require invasive dental work, please contact me and we will stop the medication 3 months prior.      -The data available at this time suggests that there is probably a small increase risk of atypical (nontraumatic) subtrochanteric fractures of the femur in patients on bisphosphonate therapy compared to those not onit. One large study suggested that for every 100 fractures prevented with bisphosphonate therapy, less than one femur fracture will occur. Other studies suggest one episode per 2,500 patient years. Patient should call withleg pain.     -Calcium: total calcium intake should be 1200mg per day from dietary sources. If not achieved with diet alone, add in calcium tablet(s). No more than 500mg at one time. Dietary sources: 8 ounces of milk,4 ounces of yogurt or 1 ounce of cheese contain about 300mg calcium per serving, green veggies, almonds, beans, oranges, along with various other plant sources.     -Vitamin D3  recommendations: 2000 IU daily.    -Bone Stimulating Exercise: impact and weight-bearing exercise like walking, jogging, yoga, Alhaji-Chi, stair-climbing, dancing, dry aerobics, and tennis 3-5 times per week for at least 30 minutes & weight-lifting orresistance training 2 times per week, may improve your bone desity and increase your strength, thereby decreasing your risk of fracture.    -Dr. Jeane Chiu has a book on Yoga for Osteoporosis and a 12 pose regimenyou can look up online.     -Fall Prevention: avoiding ice, use of Nimbixk Tracks http://www.[employer]/ to cover shoes in the winter, avoiding loose gravel and uneven terrain, clearing house of cords, throw rugs,avoiding ladders, using caution with stairs and around dogs and small children.

## 2023-12-08 ASSESSMENT — ENCOUNTER SYMPTOMS
DYSURIA: 0
JOINT SWELLING: 0
HEADACHES: 0
CONSTIPATION: 0
PARESTHESIAS: 0
MYALGIAS: 0
HEMATOCHEZIA: 0
PALPITATIONS: 0
HEARTBURN: 0
EYE PAIN: 0
DIZZINESS: 0
WEAKNESS: 0
SORE THROAT: 0
NAUSEA: 0
SHORTNESS OF BREATH: 0
DIARRHEA: 0
ARTHRALGIAS: 0
CHILLS: 0
HEMATURIA: 0
BREAST MASS: 0
FEVER: 0
COUGH: 0
ABDOMINAL PAIN: 0
FREQUENCY: 0
NERVOUS/ANXIOUS: 0

## 2023-12-12 ENCOUNTER — OFFICE VISIT (OUTPATIENT)
Dept: FAMILY MEDICINE | Facility: CLINIC | Age: 53
End: 2023-12-12
Attending: PHYSICIAN ASSISTANT
Payer: COMMERCIAL

## 2023-12-12 VITALS
RESPIRATION RATE: 12 BRPM | HEART RATE: 74 BPM | HEIGHT: 62 IN | WEIGHT: 140 LBS | BODY MASS INDEX: 25.76 KG/M2 | TEMPERATURE: 98.7 F | DIASTOLIC BLOOD PRESSURE: 62 MMHG | SYSTOLIC BLOOD PRESSURE: 104 MMHG | OXYGEN SATURATION: 98 %

## 2023-12-12 DIAGNOSIS — Z12.31 VISIT FOR SCREENING MAMMOGRAM: ICD-10-CM

## 2023-12-12 DIAGNOSIS — E78.5 HYPERLIPIDEMIA LDL GOAL <100: ICD-10-CM

## 2023-12-12 DIAGNOSIS — L98.9 SKIN LESION: ICD-10-CM

## 2023-12-12 DIAGNOSIS — M81.8 IDIOPATHIC OSTEOPOROSIS: ICD-10-CM

## 2023-12-12 DIAGNOSIS — Z00.00 ANNUAL PHYSICAL EXAM: Primary | ICD-10-CM

## 2023-12-12 DIAGNOSIS — K21.9 GASTROESOPHAGEAL REFLUX DISEASE WITHOUT ESOPHAGITIS: ICD-10-CM

## 2023-12-12 DIAGNOSIS — Z12.11 SCREEN FOR COLON CANCER: ICD-10-CM

## 2023-12-12 LAB
ERYTHROCYTE [DISTWIDTH] IN BLOOD BY AUTOMATED COUNT: 13 % (ref 10–15)
HCT VFR BLD AUTO: 41.3 % (ref 35–47)
HGB BLD-MCNC: 13.9 G/DL (ref 11.7–15.7)
MCH RBC QN AUTO: 30 PG (ref 26.5–33)
MCHC RBC AUTO-ENTMCNC: 33.7 G/DL (ref 31.5–36.5)
MCV RBC AUTO: 89 FL (ref 78–100)
PLATELET # BLD AUTO: 272 10E3/UL (ref 150–450)
RBC # BLD AUTO: 4.64 10E6/UL (ref 3.8–5.2)
WBC # BLD AUTO: 4.8 10E3/UL (ref 4–11)

## 2023-12-12 PROCEDURE — 80061 LIPID PANEL: CPT | Performed by: PHYSICIAN ASSISTANT

## 2023-12-12 PROCEDURE — 36415 COLL VENOUS BLD VENIPUNCTURE: CPT | Performed by: PHYSICIAN ASSISTANT

## 2023-12-12 PROCEDURE — 99214 OFFICE O/P EST MOD 30 MIN: CPT | Mod: 25 | Performed by: PHYSICIAN ASSISTANT

## 2023-12-12 PROCEDURE — 80053 COMPREHEN METABOLIC PANEL: CPT | Performed by: PHYSICIAN ASSISTANT

## 2023-12-12 PROCEDURE — 99396 PREV VISIT EST AGE 40-64: CPT | Performed by: PHYSICIAN ASSISTANT

## 2023-12-12 PROCEDURE — 85027 COMPLETE CBC AUTOMATED: CPT | Performed by: PHYSICIAN ASSISTANT

## 2023-12-12 PROCEDURE — 82306 VITAMIN D 25 HYDROXY: CPT | Performed by: PHYSICIAN ASSISTANT

## 2023-12-12 ASSESSMENT — ENCOUNTER SYMPTOMS
PALPITATIONS: 0
FREQUENCY: 0
BREAST MASS: 0
DIARRHEA: 0
EYE PAIN: 0
SORE THROAT: 0
PARESTHESIAS: 0
NAUSEA: 0
DYSURIA: 0
MYALGIAS: 0
FEVER: 0
HEADACHES: 0
SHORTNESS OF BREATH: 0
CHILLS: 0
JOINT SWELLING: 0
WEAKNESS: 0
HEARTBURN: 0
CONSTIPATION: 0
NERVOUS/ANXIOUS: 0
COUGH: 0
ARTHRALGIAS: 0
HEMATOCHEZIA: 0
ABDOMINAL PAIN: 0
HEMATURIA: 0
DIZZINESS: 0

## 2023-12-12 NOTE — PROGRESS NOTES
SUBJECTIVE:   Coty is a 52 year old, presenting for the following:  Physical (Fasting Physical. Pt has RECLAST infusion. Needs labs done prior. )        12/12/2023    11:08 AM   Additional Questions   Roomed by Amara Ndiaye   Accompanied by none       Coty is a pleasant 52-year-old female who presents to the clinic today for annual physical.  Past medical history significant for hyperlipidemia, osteoporosis, and GERD.  Overall doing well.  No concerns regarding her chronic health.    She continues to follow with bone specialist.  She is currently on Reclast and has her infusion next Thursday.  She is up-to-date on her DEXA scan.    Underlying reflux but things have been stable recently.    Mild hyperlipidemia which we are controlling with diet and over-the-counter fish oil.    No and hysterectomy at the age of 36.    Healthy Habits:     Getting at least 3 servings of Calcium per day:  Yes    Bi-annual eye exam:  Yes    Dental care twice a year:  Yes    Sleep apnea or symptoms of sleep apnea:  None    Diet:  Regular (no restrictions)    Frequency of exercise:  2-3 days/week    Duration of exercise:  15-30 minutes    Taking medications regularly:  Not Applicable    Additional concerns today:  Yes      Today's PHQ-2 Score:       12/12/2023    10:58 AM   PHQ-2 ( 1999 [employer])   Q1: Little interest or pleasure in doing things 0   Q2: Feeling down, depressed or hopeless 0   PHQ-2 Score 0   Q1: Little interest or pleasure in doing things Not at all   Q2: Feeling down, depressed or hopeless Not at all   PHQ-2 Score 0           Social History     Tobacco Use    Smoking status: Never     Passive exposure: Never    Smokeless tobacco: Never   Substance Use Topics    Alcohol use: Yes     Comment: Alcoholic Drinks/day: occasionally             12/8/2023     7:02 AM   Alcohol Use   Prescreen: >3 drinks/day or >7 drinks/week? No     Reviewed orders with patient.  Reviewed health maintenance and updated orders accordingly -  Yes  Lab work is in process    Breast Cancer Screening:    FHS-7:       11/16/2021    11:44 AM 11/22/2022    11:05 AM 12/12/2022     2:30 PM 12/8/2023     7:04 AM   Breast CA Risk Assessment (FHS-7)   Did any of your first-degree relatives have breast or ovarian cancer? Yes Yes Yes Yes   Did any of your relatives have bilateral breast cancer? No No Yes Yes   Did any man in your family have breast cancer? No No No No   Did any woman in your family have breast and ovarian cancer? Yes No Yes No   Did any woman in your family have breast cancer before age 50 y? No No Yes Yes   Do you have 2 or more relatives with breast and/or ovarian cancer? No No No No   Do you have 2 or more relatives with breast and/or bowel cancer? No No No No     click delete button to remove this line now  Mammogram Screening: Recommended annual mammography  Pertinent mammograms are reviewed under the imaging tab.    History of abnormal Pap smear: NO - age 30-65 PAP every 5 years with negative HPV co-testing recommended  Status post hysterectomy. Pap still indicated.      Reviewed and updated as needed this visit by clinical staff   Tobacco  Allergies  Meds              Reviewed and updated as needed this visit by Provider                 No past medical history on file.   Past Surgical History:   Procedure Laterality Date    CHOLECYSTECTOMY      HYSTERECTOMY      age 36    MAMMOPLASTY REDUCTION Bilateral     age 33    OOPHORECTOMY      OTHER SURGICAL HISTORY      right knee surgery       Review of Systems   Constitutional:  Negative for chills and fever.   HENT:  Negative for congestion, ear pain, hearing loss and sore throat.    Eyes:  Negative for pain and visual disturbance.   Respiratory:  Negative for cough and shortness of breath.    Cardiovascular:  Negative for chest pain, palpitations and peripheral edema.   Gastrointestinal:  Negative for abdominal pain, constipation, diarrhea, heartburn, hematochezia and nausea.   Breasts:   "Negative for tenderness, breast mass and discharge.   Genitourinary:  Negative for dysuria, frequency, genital sores, hematuria, pelvic pain, urgency, vaginal bleeding and vaginal discharge.   Musculoskeletal:  Negative for arthralgias, joint swelling and myalgias.   Skin:  Negative for rash.   Neurological:  Negative for dizziness, weakness, headaches and paresthesias.   Psychiatric/Behavioral:  Negative for mood changes. The patient is not nervous/anxious.           OBJECTIVE:   /62 (BP Location: Left arm, Patient Position: Sitting, Cuff Size: Adult Regular)   Pulse 74   Temp 98.7  F (37.1  C) (Oral)   Resp 12   Ht 1.575 m (5' 2\")   Wt 63.5 kg (140 lb)   LMP  (LMP Unknown)   SpO2 98%   BMI 25.61 kg/m    Physical Exam  Vitals and nursing note reviewed.   Constitutional:       General: She is not in acute distress.     Appearance: Normal appearance. She is well-developed and well-groomed. She is not ill-appearing or toxic-appearing.   HENT:      Head: Normocephalic and atraumatic.      Right Ear: Tympanic membrane, ear canal and external ear normal.      Left Ear: Tympanic membrane, ear canal and external ear normal.      Mouth/Throat:      Lips: Pink.      Mouth: Mucous membranes are moist.      Palate: No mass.      Pharynx: Oropharynx is clear. Uvula midline.      Tonsils: No tonsillar exudate or tonsillar abscesses.   Eyes:      General: Lids are normal.         Right eye: No discharge.         Left eye: No discharge.   Neck:      Trachea: Trachea normal.   Cardiovascular:      Rate and Rhythm: Normal rate and regular rhythm.      Heart sounds: S1 normal and S2 normal. No murmur heard.  Pulmonary:      Effort: Pulmonary effort is normal.      Breath sounds: Normal breath sounds and air entry.   Abdominal:      General: Bowel sounds are normal. There is no distension.      Palpations: Abdomen is soft.      Tenderness: There is no abdominal tenderness. There is no guarding or rebound. "   Musculoskeletal:      Cervical back: Full passive range of motion without pain and neck supple.      Right lower leg: No edema.      Left lower leg: No edema.   Lymphadenopathy:      Cervical: No cervical adenopathy.   Skin:     General: Skin is warm and dry.      Findings: No lesion or rash.   Neurological:      General: No focal deficit present.      Mental Status: She is alert.      Cranial Nerves: No cranial nerve deficit.      Gait: Gait is intact.      Deep Tendon Reflexes:      Reflex Scores:       Patellar reflexes are 2+ on the right side and 2+ on the left side.  Psychiatric:         Attention and Perception: Attention normal.         Mood and Affect: Mood normal.         Speech: Speech normal.           ASSESSMENT/PLAN:     Annual physical exam  Will update screening labs including a CBC, complete metabolic panel, lipid and vitamin D level.  - CBC with platelets; Future  - Comprehensive metabolic panel (BMP + Alb, Alk Phos, ALT, AST, Total. Bili, TP); Future  - CBC with platelets  - Comprehensive metabolic panel (BMP + Alb, Alk Phos, ALT, AST, Total. Bili, TP)    Hyperlipidemia LDL goal <100  Check lipid panel today.  Last lipid panel was mildly elevated.  We discussed diet and activity.  - Lipid panel reflex to direct LDL Fasting; Future  - Lipid panel reflex to direct LDL Fasting    Idiopathic osteoporosis  Continues to follow with bone specialist.  Currently on Reclast.  She is up-to-date on her DEXA scan.  Will check vitamin D and calcium level today  - Vitamin D Deficiency; Future  - Vitamin D Deficiency    DEXA 10/11/23  FINDINGS:     DXA RESULTS  -Lumbar Spine: L1-L4: BMD: 0.918 g/cm2. T-score: -2.2. Z-score: -1.3.  -RIGHT Hip Total: BMD: 0.905 g/cm2. T-score: -0.8. Z-score: -0.1.  -RIGHT Hip Femoral neck: BMD: 0.812 g/cm2. T-score: -1.6. Z-score: -0.5.  -LEFT Hip Total: BMD: 0.957 g/cm2. T-score: -0.4. Z-score: 0.3.  -LEFT Hip Femoral neck: BMD: 0.882 g/cm2. T-score: -1.1. Z-score: 0.0.     WHO  T-SCORE CRITERIA  -Normal: T score at or above -1 SD  -Osteopenia: T score between -1 and -2.5 SD  -Osteoporosis: T score at or below -2.5 SD     The World Health Organization (WHO) criteria is applicable to perimenopausal females, postmenopausal females, and men aged 50 years or older.     TBS RESULTS  -Lumbar Spine L1-L4: TBS: 1.238. TBS T-score: -2.5.TBS Z-score: -1.1.     The TBS is a DXA derived measurement for fracture risk assessment, and reflects the structural condition of the bone microarchitecture. It can be used to adjust WHO Fracture Risk Assessment Tool (FRAX) probability of fracture in postmenopausal women and   older men. The calculated probabilities of fracture have been shown to be more accurate when computed with the TBS.     INTERVAL CHANGE  -There has been a 5.4% decrease in lumbar spine BMD. This indicates significant change based on 95% confidence interval.  -There has been a 0.2% increase in the right hip BMD.     FRACTURE RISK  -The FRAX risk calculator is not applicable due to medication that is used for treatment of osteopenia/osteoporosis or can otherwise affect bone mineral density.                                                                      IMPRESSION: Low bone density (OSTEOPENIA). T score meets the WHO criteria for low bone density (osteopenia) at one or more measured sites. The risk of osteoporotic fracture increases approximately two-fold for each standard deviation decrease in T-score.    Gastroesophageal reflux disease without esophagitis  Stable at this time    Screen for colon cancer  Up-to-date on colonoscopy.  States she had a colonoscopy in 2018 inch recommended 10-year follow-up    Visit for screening mammogram  Due for mammogram.  Order placed  - MA SCREENING DIGITAL BILAT - Future  (s+30); Future    Skin lesion  She would like to see a dermatologist for a skin check.  She does have a few lesions on her left hip that she would like evaluated.  - Adult Dermatology  " Referral; Future    history of precancerous ovarian tumor  Cervical cancer screening  Underwent total hysterectomy at the age of 36.  No further Paps needed      Patient has been advised of split billing requirements and indicates understanding: Yes      COUNSELING:  Reviewed preventive health counseling, as reflected in patient instructions       Regular exercise       Healthy diet/nutrition       Vision screening       Hearing screening       Osteoporosis prevention/bone health      BMI:   Estimated body mass index is 25.61 kg/m  as calculated from the following:    Height as of this encounter: 1.575 m (5' 2\").    Weight as of this encounter: 63.5 kg (140 lb).   Weight management plan: Discussed healthy diet and exercise guidelines      She reports that she has never smoked. She has never been exposed to tobacco smoke. She has never used smokeless tobacco.          Chilo Rao PA-C  [facility]  "

## 2023-12-13 LAB
ALBUMIN SERPL BCG-MCNC: 4.8 G/DL (ref 3.5–5.2)
ALP SERPL-CCNC: 89 U/L (ref 40–150)
ALT SERPL W P-5'-P-CCNC: 50 U/L (ref 0–50)
ANION GAP SERPL CALCULATED.3IONS-SCNC: 11 MMOL/L (ref 7–15)
AST SERPL W P-5'-P-CCNC: 29 U/L (ref 0–45)
BILIRUB SERPL-MCNC: 0.3 MG/DL
BUN SERPL-MCNC: 13.7 MG/DL (ref 6–20)
CALCIUM SERPL-MCNC: 9.7 MG/DL (ref 8.6–10)
CHLORIDE SERPL-SCNC: 102 MMOL/L (ref 98–107)
CHOLEST SERPL-MCNC: 283 MG/DL
CREAT SERPL-MCNC: 0.65 MG/DL (ref 0.51–0.95)
DEPRECATED HCO3 PLAS-SCNC: 25 MMOL/L (ref 22–29)
EGFRCR SERPLBLD CKD-EPI 2021: >90 ML/MIN/1.73M2
FASTING STATUS PATIENT QL REPORTED: NO
GLUCOSE SERPL-MCNC: 91 MG/DL (ref 70–99)
HDLC SERPL-MCNC: 47 MG/DL
LDLC SERPL CALC-MCNC: 197 MG/DL
NONHDLC SERPL-MCNC: 236 MG/DL
POTASSIUM SERPL-SCNC: 4.6 MMOL/L (ref 3.4–5.3)
PROT SERPL-MCNC: 7.6 G/DL (ref 6.4–8.3)
SODIUM SERPL-SCNC: 138 MMOL/L (ref 135–145)
TRIGL SERPL-MCNC: 197 MG/DL
VIT D+METAB SERPL-MCNC: 38 NG/ML (ref 20–50)

## 2023-12-14 ENCOUNTER — INFUSION THERAPY VISIT (OUTPATIENT)
Dept: INFUSION THERAPY | Facility: CLINIC | Age: 53
End: 2023-12-14
Attending: PHYSICIAN ASSISTANT
Payer: COMMERCIAL

## 2023-12-14 VITALS
SYSTOLIC BLOOD PRESSURE: 113 MMHG | RESPIRATION RATE: 18 BRPM | HEART RATE: 83 BPM | OXYGEN SATURATION: 98 % | TEMPERATURE: 98.2 F | DIASTOLIC BLOOD PRESSURE: 94 MMHG

## 2023-12-14 DIAGNOSIS — M81.8 IDIOPATHIC OSTEOPOROSIS: Primary | ICD-10-CM

## 2023-12-14 PROCEDURE — 258N000003 HC RX IP 258 OP 636: Performed by: INTERNAL MEDICINE

## 2023-12-14 PROCEDURE — 250N000011 HC RX IP 250 OP 636: Mod: JZ | Performed by: INTERNAL MEDICINE

## 2023-12-14 PROCEDURE — 96365 THER/PROPH/DIAG IV INF INIT: CPT

## 2023-12-14 RX ORDER — ALBUTEROL SULFATE 90 UG/1
1-2 AEROSOL, METERED RESPIRATORY (INHALATION)
Start: 2024-12-13

## 2023-12-14 RX ORDER — ACETAMINOPHEN 325 MG/1
650 TABLET ORAL
OUTPATIENT
Start: 2024-12-13

## 2023-12-14 RX ORDER — DIPHENHYDRAMINE HYDROCHLORIDE 50 MG/ML
50 INJECTION INTRAMUSCULAR; INTRAVENOUS
Start: 2024-12-13

## 2023-12-14 RX ORDER — HEPARIN SODIUM (PORCINE) LOCK FLUSH IV SOLN 100 UNIT/ML 100 UNIT/ML
5 SOLUTION INTRAVENOUS
OUTPATIENT
Start: 2024-12-13

## 2023-12-14 RX ORDER — HEPARIN SODIUM,PORCINE 10 UNIT/ML
5-20 VIAL (ML) INTRAVENOUS DAILY PRN
OUTPATIENT
Start: 2024-12-13

## 2023-12-14 RX ORDER — ZOLEDRONIC ACID 5 MG/100ML
5 INJECTION, SOLUTION INTRAVENOUS ONCE
Start: 2024-12-13

## 2023-12-14 RX ORDER — EPINEPHRINE 1 MG/ML
0.3 INJECTION, SOLUTION INTRAMUSCULAR; SUBCUTANEOUS EVERY 5 MIN PRN
Status: DISCONTINUED | OUTPATIENT
Start: 2023-12-14 | End: 2023-12-14 | Stop reason: HOSPADM

## 2023-12-14 RX ORDER — METHYLPREDNISOLONE SODIUM SUCCINATE 125 MG/2ML
125 INJECTION, POWDER, LYOPHILIZED, FOR SOLUTION INTRAMUSCULAR; INTRAVENOUS
Status: DISCONTINUED | OUTPATIENT
Start: 2023-12-14 | End: 2023-12-14 | Stop reason: HOSPADM

## 2023-12-14 RX ORDER — MEPERIDINE HYDROCHLORIDE 50 MG/ML
25 INJECTION INTRAMUSCULAR; INTRAVENOUS; SUBCUTANEOUS EVERY 30 MIN PRN
OUTPATIENT
Start: 2024-12-13

## 2023-12-14 RX ORDER — ALBUTEROL SULFATE 90 UG/1
1-2 AEROSOL, METERED RESPIRATORY (INHALATION)
Status: DISCONTINUED | OUTPATIENT
Start: 2023-12-14 | End: 2023-12-14 | Stop reason: HOSPADM

## 2023-12-14 RX ORDER — ALBUTEROL SULFATE 0.83 MG/ML
2.5 SOLUTION RESPIRATORY (INHALATION)
OUTPATIENT
Start: 2024-12-13

## 2023-12-14 RX ORDER — ZOLEDRONIC ACID 5 MG/100ML
5 INJECTION, SOLUTION INTRAVENOUS ONCE
Qty: 100 ML | Refills: 0 | Status: COMPLETED | OUTPATIENT
Start: 2023-12-14 | End: 2023-12-14

## 2023-12-14 RX ORDER — METHYLPREDNISOLONE SODIUM SUCCINATE 125 MG/2ML
125 INJECTION, POWDER, LYOPHILIZED, FOR SOLUTION INTRAMUSCULAR; INTRAVENOUS
Start: 2024-12-13

## 2023-12-14 RX ORDER — EPINEPHRINE 1 MG/ML
0.3 INJECTION, SOLUTION INTRAMUSCULAR; SUBCUTANEOUS EVERY 5 MIN PRN
OUTPATIENT
Start: 2024-12-13

## 2023-12-14 RX ORDER — MEPERIDINE HYDROCHLORIDE 50 MG/ML
25 INJECTION INTRAMUSCULAR; INTRAVENOUS; SUBCUTANEOUS EVERY 30 MIN PRN
Status: DISCONTINUED | OUTPATIENT
Start: 2023-12-14 | End: 2023-12-14 | Stop reason: HOSPADM

## 2023-12-14 RX ORDER — DIPHENHYDRAMINE HYDROCHLORIDE 50 MG/ML
50 INJECTION INTRAMUSCULAR; INTRAVENOUS
Status: DISCONTINUED | OUTPATIENT
Start: 2023-12-14 | End: 2023-12-14 | Stop reason: HOSPADM

## 2023-12-14 RX ORDER — ALBUTEROL SULFATE 0.83 MG/ML
2.5 SOLUTION RESPIRATORY (INHALATION)
Status: DISCONTINUED | OUTPATIENT
Start: 2023-12-14 | End: 2023-12-14 | Stop reason: HOSPADM

## 2023-12-14 RX ADMIN — ZOLEDRONIC ACID 5 MG: 5 INJECTION, SOLUTION INTRAVENOUS at 14:39

## 2023-12-14 RX ADMIN — SODIUM CHLORIDE 250 ML: 9 INJECTION, SOLUTION INTRAVENOUS at 14:38

## 2023-12-14 NOTE — PROGRESS NOTES
Infusion Nursing Note:  Coty Cardona presents today for Reclast.    Patient seen by provider today: No   present during visit today: Not Applicable.    Note: Pt very anxious about IV start; placed by Lakeisha BLANCHARD Reviewed potential side effects, whom to call if problems; hand-out given      Intravenous Access:  Peripheral IV placed.    Treatment Conditions:  Results reviewed, labs MET treatment parameters, ok to proceed with treatment.      Post Infusion Assessment:  Patient tolerated infusion without incident.  Blood return noted pre and post infusion.  Access discontinued per protocol.       Discharge Plan:   Patient verbalized understanding of discharge instructions and all questions answered.  Patient discharged in stable condition accompanied by: self.  Departure Mode: Ambulatory.      Missy Eason RN

## 2024-01-08 ENCOUNTER — TELEPHONE (OUTPATIENT)
Dept: FAMILY MEDICINE | Facility: CLINIC | Age: 54
End: 2024-01-08
Payer: COMMERCIAL

## 2024-01-08 NOTE — TELEPHONE ENCOUNTER
General Call      Reason for Call:  referral for derm    What are your questions or concerns:  referral for dermatology needs to be sent to new insurance carrier for 2024 year, [employer] [place]. Please advise

## 2024-01-10 ENCOUNTER — ANCILLARY PROCEDURE (OUTPATIENT)
Dept: MAMMOGRAPHY | Facility: CLINIC | Age: 54
End: 2024-01-10
Attending: PHYSICIAN ASSISTANT
Payer: COMMERCIAL

## 2024-01-10 DIAGNOSIS — Z12.31 VISIT FOR SCREENING MAMMOGRAM: ICD-10-CM

## 2024-01-10 PROCEDURE — 77063 BREAST TOMOSYNTHESIS BI: CPT

## 2024-04-08 ENCOUNTER — TRANSFERRED RECORDS (OUTPATIENT)
Dept: HEALTH INFORMATION MANAGEMENT | Facility: CLINIC | Age: 54
End: 2024-04-08
Payer: COMMERCIAL

## 2024-10-10 ENCOUNTER — ANCILLARY PROCEDURE (OUTPATIENT)
Dept: BONE DENSITY | Facility: CLINIC | Age: 54
End: 2024-10-10
Attending: INTERNAL MEDICINE
Payer: COMMERCIAL

## 2024-10-10 ENCOUNTER — OFFICE VISIT (OUTPATIENT)
Dept: INTERNAL MEDICINE | Facility: CLINIC | Age: 54
End: 2024-10-10
Payer: COMMERCIAL

## 2024-10-10 VITALS
RESPIRATION RATE: 16 BRPM | HEIGHT: 62 IN | SYSTOLIC BLOOD PRESSURE: 100 MMHG | TEMPERATURE: 97.8 F | DIASTOLIC BLOOD PRESSURE: 64 MMHG | WEIGHT: 114.3 LBS | BODY MASS INDEX: 21.04 KG/M2 | HEART RATE: 92 BPM | OXYGEN SATURATION: 99 %

## 2024-10-10 DIAGNOSIS — M81.8 IDIOPATHIC OSTEOPOROSIS: ICD-10-CM

## 2024-10-10 DIAGNOSIS — E28.319 PREMATURE MENOPAUSE: ICD-10-CM

## 2024-10-10 DIAGNOSIS — M81.8 IDIOPATHIC OSTEOPOROSIS: Primary | ICD-10-CM

## 2024-10-10 LAB
ALBUMIN SERPL BCG-MCNC: 4.9 G/DL (ref 3.5–5.2)
ALP SERPL-CCNC: 75 U/L (ref 40–150)
ALT SERPL W P-5'-P-CCNC: 35 U/L (ref 0–50)
ANION GAP SERPL CALCULATED.3IONS-SCNC: 11 MMOL/L (ref 7–15)
AST SERPL W P-5'-P-CCNC: 26 U/L (ref 0–45)
BILIRUB SERPL-MCNC: 0.3 MG/DL
BUN SERPL-MCNC: 16.8 MG/DL (ref 6–20)
CALCIUM SERPL-MCNC: 9.7 MG/DL (ref 8.8–10.4)
CHLORIDE SERPL-SCNC: 105 MMOL/L (ref 98–107)
CREAT SERPL-MCNC: 0.62 MG/DL (ref 0.51–0.95)
EGFRCR SERPLBLD CKD-EPI 2021: >90 ML/MIN/1.73M2
GLUCOSE SERPL-MCNC: 76 MG/DL (ref 70–99)
HCO3 SERPL-SCNC: 25 MMOL/L (ref 22–29)
POTASSIUM SERPL-SCNC: 4 MMOL/L (ref 3.4–5.3)
PROT SERPL-MCNC: 7.5 G/DL (ref 6.4–8.3)
SODIUM SERPL-SCNC: 141 MMOL/L (ref 135–145)
VIT D+METAB SERPL-MCNC: 87 NG/ML (ref 20–50)

## 2024-10-10 PROCEDURE — 77080 DXA BONE DENSITY AXIAL: CPT | Mod: TC | Performed by: PHYSICIAN ASSISTANT

## 2024-10-10 PROCEDURE — G2211 COMPLEX E/M VISIT ADD ON: HCPCS | Performed by: INTERNAL MEDICINE

## 2024-10-10 PROCEDURE — 80053 COMPREHEN METABOLIC PANEL: CPT | Performed by: INTERNAL MEDICINE

## 2024-10-10 PROCEDURE — 82306 VITAMIN D 25 HYDROXY: CPT | Performed by: INTERNAL MEDICINE

## 2024-10-10 PROCEDURE — 36415 COLL VENOUS BLD VENIPUNCTURE: CPT | Performed by: INTERNAL MEDICINE

## 2024-10-10 PROCEDURE — 77091 TBS TECHL CALCULATION ONLY: CPT | Performed by: PHYSICIAN ASSISTANT

## 2024-10-10 PROCEDURE — 99215 OFFICE O/P EST HI 40 MIN: CPT | Performed by: INTERNAL MEDICINE

## 2024-10-11 NOTE — PROGRESS NOTES
Assessment & Plan     Idiopathic osteoporosis    - Vitamin D Deficiency; Future  - Comprehensive metabolic panel; Future  - Vitamin D Deficiency  - Comprehensive metabolic panel    Premature menopause    54 yo female is here today for follow up of osteoporosis treatment.   Patient has history of precancerous cells on her ovary and for that reason had hysterectomy and bilateral oophorectomy at age of 36.  She was on HRT just for a year and this therapy was stopped because of the maternal history of breast cancer.  She was on Prolia, from 6200-4505 and 1640-5661. She was on Actonel very shortly prior Prolia in 2012.  Patient was treated with Evenity for 12 months, 4931-1306.  She had Reclast infusion in 10/2021 and 12/2022, 12/2023, which she tolerated well.  Workup for secondary causes of osteoporosis done in 2019 was normal.  She has h/o wrist fracture.  DXA scan done today showed decline of 4.5% in the lumbar spine with L1-L4 T score -2.5, compared to DXA done last year.      She made significant changes in her diet and lost 30 lbs in the last year. She is taking Centrum silver MVI which has 300 mg Ca and 1000 international unit(s) vit D. She is also taking vit D 5000 international unit(s) supplement with K2.  Decline in the bone density can possibly be explained with significant weight loss and change in the nutrition status.    We discussed possible treatment options, Forteo, Tymlos, Prolia. Printed info provided. She opted not to do any active treatment for a year. We will check the labs today so she can adjust her supplement use.  She will start weight bearing exercise like Pilates 2-3x/week. I will see her for the follow up in 1 year after DXA scan is done.        Total time spent on the date of this encounter doing: chart review, review of test results, patient visit, physical exam, education, counseling, developing plan of care, and documenting =  41   minutes.     The longitudinal plan of care for the  "diagnosis(es)/condition(s) as documented were addressed during this visit. Due to the added complexity in care, I will continue to support Coty in the subsequent management and with ongoing continuity of care.          Subjective   Coty is a 53 year old, presenting for the following health issues:  Follow Up (Osteo F/U)      10/10/2024    12:00 PM   Additional Questions   Roomed by Baylee     Via the Health Maintenance questionnaire, the patient has reported the following services have been completed -Colonscopy: Mngi 2021-09-01, this information has been sent to the abstraction team.  HPI                   Objective    /64   Pulse 92   Temp 97.8  F (36.6  C)   Resp 16   Ht 1.575 m (5' 2\")   Wt 51.8 kg (114 lb 4.8 oz)   LMP  (LMP Unknown)   SpO2 99%   BMI 20.91 kg/m    Body mass index is 20.91 kg/m .  Physical Exam               Signed Electronically by: Saira Olivas MD    "

## 2024-12-06 ENCOUNTER — DOCUMENTATION ONLY (OUTPATIENT)
Dept: FAMILY MEDICINE | Facility: CLINIC | Age: 54
End: 2024-12-06
Payer: COMMERCIAL

## 2024-12-06 DIAGNOSIS — E78.5 HYPERLIPIDEMIA LDL GOAL <100: Primary | ICD-10-CM

## 2024-12-06 DIAGNOSIS — Z00.00 ANNUAL PHYSICAL EXAM: ICD-10-CM

## 2024-12-06 NOTE — PROGRESS NOTES
Coty Cardona has an upcoming lab appointment:    Future Appointments   Date Time Provider Department Center   12/13/2024  9:00 AM [facility]   12/17/2024  8:30 AM Chilo Rao PA-C [facility]     Patient is scheduled for the following lab(s):    There is no order available. Please review and place either future orders or HMPO (Review of Health Maintenance Protocol Orders), as appropriate.    Health Maintenance Due   Topic    ANNUAL REVIEW OF HM ORDERS     HIV SCREENING     LIPID      Moise Caceres  Health Maintenance orders last signed by     Health Maintenance Due   Topic    ANNUAL REVIEW OF HM ORDERS     HIV SCREENING     LIPID

## 2024-12-13 SDOH — HEALTH STABILITY: PHYSICAL HEALTH: ON AVERAGE, HOW MANY DAYS PER WEEK DO YOU ENGAGE IN MODERATE TO STRENUOUS EXERCISE (LIKE A BRISK WALK)?: 6 DAYS

## 2024-12-13 SDOH — HEALTH STABILITY: PHYSICAL HEALTH: ON AVERAGE, HOW MANY MINUTES DO YOU ENGAGE IN EXERCISE AT THIS LEVEL?: 40 MIN

## 2024-12-13 ASSESSMENT — SOCIAL DETERMINANTS OF HEALTH (SDOH): HOW OFTEN DO YOU GET TOGETHER WITH FRIENDS OR RELATIVES?: MORE THAN THREE TIMES A WEEK

## 2024-12-16 PROBLEM — Z90.710 S/P HYSTERECTOMY: Status: ACTIVE | Noted: 2024-12-16

## 2024-12-16 PROBLEM — M81.0 OSTEOPOROSIS: Status: ACTIVE | Noted: 2022-10-13

## 2024-12-17 ENCOUNTER — OFFICE VISIT (OUTPATIENT)
Dept: FAMILY MEDICINE | Facility: CLINIC | Age: 54
End: 2024-12-17
Payer: COMMERCIAL

## 2024-12-17 VITALS
DIASTOLIC BLOOD PRESSURE: 64 MMHG | HEIGHT: 62 IN | WEIGHT: 115 LBS | OXYGEN SATURATION: 98 % | TEMPERATURE: 98.7 F | BODY MASS INDEX: 21.16 KG/M2 | HEART RATE: 88 BPM | SYSTOLIC BLOOD PRESSURE: 102 MMHG | RESPIRATION RATE: 14 BRPM

## 2024-12-17 DIAGNOSIS — L98.9 SKIN LESION: ICD-10-CM

## 2024-12-17 DIAGNOSIS — Z00.00 ANNUAL PHYSICAL EXAM: Primary | ICD-10-CM

## 2024-12-17 DIAGNOSIS — K21.9 GASTROESOPHAGEAL REFLUX DISEASE WITHOUT ESOPHAGITIS: ICD-10-CM

## 2024-12-17 DIAGNOSIS — M81.8 IDIOPATHIC OSTEOPOROSIS: ICD-10-CM

## 2024-12-17 DIAGNOSIS — B07.0 PLANTAR WARTS: ICD-10-CM

## 2024-12-17 DIAGNOSIS — R22.42 MASS OF LEFT FOOT: ICD-10-CM

## 2024-12-17 DIAGNOSIS — Z76.89: ICD-10-CM

## 2024-12-17 PROCEDURE — 99396 PREV VISIT EST AGE 40-64: CPT | Performed by: PHYSICIAN ASSISTANT

## 2024-12-17 PROCEDURE — 99213 OFFICE O/P EST LOW 20 MIN: CPT | Mod: 25 | Performed by: PHYSICIAN ASSISTANT

## 2024-12-17 PROCEDURE — 36415 COLL VENOUS BLD VENIPUNCTURE: CPT | Performed by: PHYSICIAN ASSISTANT

## 2024-12-17 PROCEDURE — 82306 VITAMIN D 25 HYDROXY: CPT | Performed by: PHYSICIAN ASSISTANT

## 2024-12-17 NOTE — PROGRESS NOTES
Preventive Care Visit  [facility]  Chilo Rao PA-C, Family Medicine  Dec 17, 2024      Assessment & Plan     Annual physical exam  Overall doing well.    Idiopathic osteoporosis  Continues to follow with internal medicine.  Currently on a drug holiday.  There was some mild decrease in her most recent DEXA scan.  They plan to recheck a DEXA scan in 1 year.  Continue with vitamin D and calciu  Last vitamin D level was high so she has decreased vitamin D supplements to 3 times a week.  She would like to recheck a vitamin D level today.  - Vitamin D Deficiency    Gastroesophageal reflux disease without esophagitis  Stable at this time    Skin lesion  Can lesion to right hip.  Saw dermatology in the past.  She would like to follow-up with them  - Adult Dermatology  Referral; Future    Plantar warts  Plantar wart to right foot continues to do home remedy.  If not better can follow-up with dermatology  - Adult Dermatology  Referral; Future    Mass of left foot  She does have a mass to the bottom of her left foot.  This is not painful.  On exam it is approximately 1 cm x 0.5 cm nonfixed cyst like.  No redness or warmth.  No concerns for infection.  Suspect lipoma.  It is not mobile is much as I would like it to be so we will start with getting an x-ray to make sure that this is not a bone lesion.  - XR Foot Left G/E 3 Views; Future    Encounter for functional medicine visit  She would like to follow-up with functional medicine provider.  She states that her insurance will cover the [facility] and [facility].  Referral placed  - Primary Care - Care Coordination Referral; Future    Breast cancer screening  Up-to-date on mammogram    Colon cancer screening  Up-to-date on colonoscopy    Cervical cancer screening  These have been discontinued in the past as she has had a hysterectomy    Patient has been advised of split billing requirements and  indicates understanding: Yes        Counseling  Appropriate preventive services were addressed with this patient via screening, questionnaire, or discussion as appropriate for fall prevention, nutrition, physical activity, Tobacco-use cessation, social engagement, weight loss and cognition.  Checklist reviewing preventive services available has been given to the patient.  Reviewed patient's diet, addressing concerns and/or questions.         Subjective   Coty is a 54 year old, presenting for the following:  Physical (Fasting Physical)        12/17/2024     7:59 AM   Additional Questions   Roomed by Amara Ndiaye   Accompanied by jeri          Coty is a pleasant 54-year-old female who presents to the clinic today for an annual physical.  Past medical history significant for hyperlipidemia and osteoporosis.  Over the last year her and her  have made significant lifestyle modifications.  They have been cutting out processed foods and added sugars.  They have also been staying quite active.    History of osteoporosis continues to follow with internal medicine.  Currently on a drug holiday.    She has a skin lesion to her right hip and plantar wart to her right foot she would like to follow-up with dermatology.  She has seen dermatology for the skin lesion on her right hip in the past.        Health Care Directive  Patient does not have a Health Care Directive:       12/13/2024   General Health   How would you rate your overall physical health? Good   Feel stress (tense, anxious, or unable to sleep) Only a little      (!) STRESS CONCERN      12/13/2024   Nutrition   Three or more servings of calcium each day? Yes   Diet: Other   If other, please elaborate: Low sugar, low preservative   How many servings of fruit and vegetables per day? 4 or more   How many sweetened beverages each day? 0-1            12/13/2024   Exercise   Days per week of moderate/strenous exercise 6 days   Average minutes spent exercising at  this level 40 min            12/13/2024   Social Factors   Frequency of gathering with friends or relatives More than three times a week   Worry food won't last until get money to buy more No   Food not last or not have enough money for food? No   Do you have housing? (Housing is defined as stable permanent housing and does not include staying ouside in a car, in a tent, in an abandoned building, in an overnight shelter, or couch-surfing.) Yes   Are you worried about losing your housing? No   Lack of transportation? No   Unable to get utilities (heat,electricity)? No            12/13/2024   Fall Risk   Fallen 2 or more times in the past year? No    Trouble with walking or balance? No        Patient-reported          12/13/2024   Dental   Dentist two times every year? Yes            12/13/2024   TB Screening   Were you born outside of the [place]? No                  12/13/2024   Substance Use   Alcohol more than 3/day or more than 7/wk No   Do you use any other substances recreationally? (!) CANNABIS PRODUCTS    (!) BATH SALTS       Multiple values from one day are sorted in reverse-chronological order     Social History     Tobacco Use    Smoking status: Never     Passive exposure: Never    Smokeless tobacco: Never   Vaping Use    Vaping status: Never Used   Substance Use Topics    Alcohol use: Yes     Comment: Alcoholic Drinks/day: occasionally    Drug use: No           1/10/2024   LAST FHS-7 RESULTS   1st degree relative breast or ovarian cancer Yes   Any relative bilateral breast cancer No   Any male have breast cancer No   Any ONE woman have BOTH breast AND ovarian cancer No   Any woman with breast cancer before 50yrs Yes   2 or more relatives with breast AND/OR ovarian cancer Yes   2 or more relatives with breast AND/OR bowel cancer No           Mammogram Screening - Mammogram every 1-2 years updated in Health Maintenance based on mutual decision making          12/13/2024   One time HIV Screening   Previous HIV  "test? I don't know          12/13/2024   STI Screening   New sexual partner(s) since last STI/HIV test? No        History of abnormal Pap smear: Status post hysterectomy. Pap still indicated.        ASCVD Risk   The 10-year ASCVD risk score (Ulises MONTGOMERY, et al., 2019) is: 1.9%    Values used to calculate the score:      Age: 54 years      Sex: Female      Is Non- : No      Diabetic: No      Tobacco smoker: No      Systolic Blood Pressure: 102 mmHg      Is BP treated: No      HDL Cholesterol: 55 mg/dL      Total Cholesterol: 269 mg/dL           Reviewed and updated as needed this visit by Provider                    No past medical history on file.  Past Surgical History:   Procedure Laterality Date    CHOLECYSTECTOMY      HYSTERECTOMY      age 36    MAMMOPLASTY REDUCTION Bilateral     age 33    OOPHORECTOMY      OTHER SURGICAL HISTORY      right knee surgery         Review of Systems  Constitutional, HEENT, cardiovascular, pulmonary, GI, , musculoskeletal, neuro, skin, endocrine and psych systems are negative, except as otherwise noted.     Objective    Exam  /64 (BP Location: Left arm, Patient Position: Sitting, Cuff Size: Adult Regular)   Pulse 88   Temp 98.7  F (37.1  C) (Oral)   Resp 14   Ht 1.575 m (5' 2\")   Wt 52.2 kg (115 lb)   LMP  (LMP Unknown)   SpO2 98%   Breastfeeding No   BMI 21.03 kg/m     Estimated body mass index is 21.03 kg/m  as calculated from the following:    Height as of this encounter: 1.575 m (5' 2\").    Weight as of this encounter: 52.2 kg (115 lb).    Physical Exam  Vitals and nursing note reviewed.   Constitutional:       General: She is not in acute distress.     Appearance: Normal appearance. She is not ill-appearing, toxic-appearing or diaphoretic.   HENT:      Head: Normocephalic and atraumatic.   Eyes:      Conjunctiva/sclera: Conjunctivae normal.   Cardiovascular:      Rate and Rhythm: Normal rate and regular rhythm.      Heart sounds: " No murmur heard.     No friction rub. No gallop.   Pulmonary:      Effort: Pulmonary effort is normal.      Breath sounds: No wheezing, rhonchi or rales.   Abdominal:      General: Abdomen is flat.      Tenderness: There is no abdominal tenderness. There is no guarding or rebound.   Feet:      Comments: 1 cm x 0.5 cm mass to plantar aspect of mid right foot.  Nonpainful.  No redness or warmth.  Skin:     General: Skin is warm and dry.      Findings: No rash.   Neurological:      General: No focal deficit present.      Mental Status: She is alert and oriented to person, place, and time.      Motor: No weakness.   Psychiatric:         Mood and Affect: Mood normal.         Behavior: Behavior normal.         Thought Content: Thought content normal.         Judgment: Judgment normal.             Signed Electronically by: Chilo Rao PA-C

## 2024-12-17 NOTE — LETTER
December 17, 2024      Coty Cardona  [address] [place] [address]        To Whom It May Concern,     The patient would benefit from Pilates and strength training due to her underlying history of osteoporosis.  I would asked that insurance pays for these services      Sincerely,        Chilo Rao PA-C

## 2024-12-17 NOTE — LETTER
12/17/2024      Coty Cardona  [address]  [facility] [address]        Preventive Care Visit  [facility]  Chilo Rao PA-C, Family Medicine  Dec 17, 2024  {Provider  Link to SmartSet :516605}    {PROVIDER CHARTING PREFERENCE:886949}    Subjective  Coty is a 54 year old, presenting for the following:  Physical (Fasting Physical)        12/17/2024     7:59 AM   Additional Questions   Roomed by Amara Ndiaye   Accompanied by none          HPI  ***  {MA/LPN/RN Pre-Provider Visit Orders- hCG/UA/Strep (Optional):581751}  {SUPERLIST (Optional):109426}  {additonal problems for provider to add (Optional):665521}  Health Care Directive  Patient does not have a Health Care Directive: {ADVANCE_DIRECTIVE_STATUS:337900}      12/13/2024   General Health   How would you rate your overall physical health? Good   Feel stress (tense, anxious, or unable to sleep) Only a little      (!) STRESS CONCERN      12/13/2024   Nutrition   Three or more servings of calcium each day? Yes   Diet: Other   If other, please elaborate: Low sugar, low preservative   How many servings of fruit and vegetables per day? 4 or more   How many sweetened beverages each day? 0-1            12/13/2024   Exercise   Days per week of moderate/strenous exercise 6 days   Average minutes spent exercising at this level 40 min            12/13/2024   Social Factors   Frequency of gathering with friends or relatives More than three times a week   Worry food won't last until get money to buy more No   Food not last or not have enough money for food? No   Do you have housing? (Housing is defined as stable permanent housing and does not include staying ouside in a car, in a tent, in an abandoned building, in an overnight shelter, or couch-surfing.) Yes   Are you worried about losing your housing? No   Lack of transportation? No   Unable to get utilities (heat,electricity)? No            12/13/2024   Fall Risk   Fallen 2 or  more times in the past year? No    Trouble with walking or balance? No        Patient-reported          12/13/2024   Dental   Dentist two times every year? Yes            12/13/2024   TB Screening   Were you born outside of the [place]? No          {Rooming Staff Patient needs a PHQ as part of the AWV.  Use this link to complete and then refresh the note to pull results Link to PHQ2 Assessment :923179}  {USE TO PULL IN PHQ RESULTS FOR TODAY:849719}      12/13/2024   Substance Use   Alcohol more than 3/day or more than 7/wk No   Do you use any other substances recreationally? (!) CANNABIS PRODUCTS    (!) BATH SALTS       Multiple values from one day are sorted in reverse-chronological order     Social History     Tobacco Use    Smoking status: Never     Passive exposure: Never    Smokeless tobacco: Never   Vaping Use    Vaping status: Never Used   Substance Use Topics    Alcohol use: Yes     Comment: Alcoholic Drinks/day: occasionally    Drug use: No     {Provider  If there are gaps in the social history shown above, please follow the link to update and then refresh the note Link to Social and Substance History :622790}      1/10/2024   LAST FHS-7 RESULTS   1st degree relative breast or ovarian cancer Yes   Any relative bilateral breast cancer No   Any male have breast cancer No   Any ONE woman have BOTH breast AND ovarian cancer No   Any woman with breast cancer before 50yrs Yes   2 or more relatives with breast AND/OR ovarian cancer Yes   2 or more relatives with breast AND/OR bowel cancer No        {If any of the questions to the FHS7 are answered yes, consider referral for genetic counseling.    Additional indications for genetic referral include personal history of breast or ovarian cancer, genetic mutation in 1st degree relative which increases risk of breast cancer including BRCA1, BRCA2, KRISTIN, PALB 2, TP53, CHEK2, PTEN, CDH1, STK11 (per ACS) and/or 1st degree relative with history of pancreatic or high-risk  "prostate cancer (per NCCN):991564}   {Mammogram Decision Support (Optional):865460}          12/13/2024   One time HIV Screening   Previous HIV test? I don't know          12/13/2024   STI Screening   New sexual partner(s) since last STI/HIV test? No        History of abnormal Pap smear: { :901356}       ASCVD Risk   The 10-year ASCVD risk score (Ulises MONTGOMERY, et al., 2019) is: 1.9%    Values used to calculate the score:      Age: 54 years      Sex: Female      Is Non- : No      Diabetic: No      Tobacco smoker: No      Systolic Blood Pressure: 102 mmHg      Is BP treated: No      HDL Cholesterol: 55 mg/dL      Total Cholesterol: 269 mg/dL    {Link to Fracture Risk Assessment Tool (Optional):580688}    {Provider  REQUIRED FOR AWV Use the storyboard to review patient history, after sections have been marked as reviewed, refresh note to capture documentation:204812}   Reviewed and updated as needed this visit by Provider                    {HISTORY OPTIONS (Optional):283307}    {ROS Picklists (Optional):842614}     Objective   Exam  /64 (BP Location: Left arm, Patient Position: Sitting, Cuff Size: Adult Regular)   Pulse 88   Temp 98.7  F (37.1  C) (Oral)   Resp 14   Ht 1.575 m (5' 2\")   Wt 52.2 kg (115 lb)   LMP  (LMP Unknown)   SpO2 98%   Breastfeeding No   BMI 21.03 kg/m     Estimated body mass index is 21.03 kg/m  as calculated from the following:    Height as of this encounter: 1.575 m (5' 2\").    Weight as of this encounter: 52.2 kg (115 lb).    Physical Exam  {Exam Choices (Optional):716476}        Signed Electronically by: Chilo Rao PA-C  {Email feedback regarding this note to [email or web address]   :874226}    Preventive Care Visit  [facility]  Chilo Rao PA-C, Family Medicine  Dec 17, 2024  {Provider  Link to SmartSet :585971}    {PROVIDER CHARTING PREFERENCE:618536}    Subjective  Coty is a " 54 year old, presenting for the following:  Physical (Fasting Physical)        12/17/2024     7:59 AM   Additional Questions   Roomed by Amara Ndiaye   Accompanied by jeri          Coty is a pleasant 54-year-old female who presents to the clinic today for an annual physical.  Past medical history significant for hyperlipidemia and osteoporosis.  Over the last year her and her  have made significant lifestyle modifications.  They have been cutting out processed foods and added sugars.  They have also been staying quite active.      ***  {MA/LPN/RN Pre-Provider Visit Orders- hCG/UA/Strep (Optional):944463}  {SUPERLIST (Optional):383099}  {additonal problems for provider to add (Optional):948089}  Health Care Directive  Patient does not have a Health Care Directive: {ADVANCE_DIRECTIVE_STATUS:338888}      12/13/2024   General Health   How would you rate your overall physical health? Good   Feel stress (tense, anxious, or unable to sleep) Only a little      (!) STRESS CONCERN      12/13/2024   Nutrition   Three or more servings of calcium each day? Yes   Diet: Other   If other, please elaborate: Low sugar, low preservative   How many servings of fruit and vegetables per day? 4 or more   How many sweetened beverages each day? 0-1            12/13/2024   Exercise   Days per week of moderate/strenous exercise 6 days   Average minutes spent exercising at this level 40 min            12/13/2024   Social Factors   Frequency of gathering with friends or relatives More than three times a week   Worry food won't last until get money to buy more No   Food not last or not have enough money for food? No   Do you have housing? (Housing is defined as stable permanent housing and does not include staying ouside in a car, in a tent, in an abandoned building, in an overnight shelter, or couch-surfing.) Yes   Are you worried about losing your housing? No   Lack of transportation? No   Unable to get utilities (heat,electricity)?  No            12/13/2024   Fall Risk   Fallen 2 or more times in the past year? No    Trouble with walking or balance? No        Patient-reported          12/13/2024   Dental   Dentist two times every year? Yes            12/13/2024   TB Screening   Were you born outside of the [place]? No          {Rooming Staff Patient needs a PHQ as part of the AWV.  Use this link to complete and then refresh the note to pull results Link to PHQ2 Assessment :172858}  {USE TO PULL IN PHQ RESULTS FOR TODAY:196841}      12/13/2024   Substance Use   Alcohol more than 3/day or more than 7/wk No   Do you use any other substances recreationally? (!) CANNABIS PRODUCTS    (!) BATH SALTS       Multiple values from one day are sorted in reverse-chronological order     Social History     Tobacco Use    Smoking status: Never     Passive exposure: Never    Smokeless tobacco: Never   Vaping Use    Vaping status: Never Used   Substance Use Topics    Alcohol use: Yes     Comment: Alcoholic Drinks/day: occasionally    Drug use: No     {Provider  If there are gaps in the social history shown above, please follow the link to update and then refresh the note Link to Social and Substance History :512592}      1/10/2024   LAST FHS-7 RESULTS   1st degree relative breast or ovarian cancer Yes   Any relative bilateral breast cancer No   Any male have breast cancer No   Any ONE woman have BOTH breast AND ovarian cancer No   Any woman with breast cancer before 50yrs Yes   2 or more relatives with breast AND/OR ovarian cancer Yes   2 or more relatives with breast AND/OR bowel cancer No        {If any of the questions to the FHS7 are answered yes, consider referral for genetic counseling.    Additional indications for genetic referral include personal history of breast or ovarian cancer, genetic mutation in 1st degree relative which increases risk of breast cancer including BRCA1, BRCA2, KRISTIN, PALB 2, TP53, CHEK2, PTEN, CDH1, STK11 (per ACS) and/or 1st degree  "relative with history of pancreatic or high-risk prostate cancer (per NCCN):581352}   {Mammogram Decision Support (Optional):212505}          12/13/2024   One time HIV Screening   Previous HIV test? I don't know          12/13/2024   STI Screening   New sexual partner(s) since last STI/HIV test? No        History of abnormal Pap smear: { :749070}       ASCVD Risk   The 10-year ASCVD risk score (Ulises MONTGOMERY, et al., 2019) is: 1.9%    Values used to calculate the score:      Age: 54 years      Sex: Female      Is Non- : No      Diabetic: No      Tobacco smoker: No      Systolic Blood Pressure: 102 mmHg      Is BP treated: No      HDL Cholesterol: 55 mg/dL      Total Cholesterol: 269 mg/dL    {Link to Fracture Risk Assessment Tool (Optional):981367}    {Provider  REQUIRED FOR AWV Use the storyboard to review patient history, after sections have been marked as reviewed, refresh note to capture documentation:517291}   Reviewed and updated as needed this visit by Provider                    {HISTORY OPTIONS (Optional):144076}    {ROS Picklists (Optional):348921}     Objective   Exam  /64 (BP Location: Left arm, Patient Position: Sitting, Cuff Size: Adult Regular)   Pulse 88   Temp 98.7  F (37.1  C) (Oral)   Resp 14   Ht 1.575 m (5' 2\")   Wt 52.2 kg (115 lb)   LMP  (LMP Unknown)   SpO2 98%   Breastfeeding No   BMI 21.03 kg/m     Estimated body mass index is 21.03 kg/m  as calculated from the following:    Height as of this encounter: 1.575 m (5' 2\").    Weight as of this encounter: 52.2 kg (115 lb).    Physical Exam  {Exam Choices (Optional):043754}        Signed Electronically by: Chilo Rao PA-C  {Email feedback regarding this note to [email or web address]   :249448}      Sincerely,        Chilo Rao PA-C      "

## 2024-12-18 ENCOUNTER — PATIENT OUTREACH (OUTPATIENT)
Dept: CARE COORDINATION | Facility: CLINIC | Age: 54
End: 2024-12-18

## 2024-12-18 ENCOUNTER — ANCILLARY PROCEDURE (OUTPATIENT)
Dept: GENERAL RADIOLOGY | Facility: CLINIC | Age: 54
End: 2024-12-18
Attending: PHYSICIAN ASSISTANT
Payer: COMMERCIAL

## 2024-12-18 DIAGNOSIS — R22.42 MASS OF LEFT FOOT: ICD-10-CM

## 2024-12-18 LAB — VIT D+METAB SERPL-MCNC: 80 NG/ML (ref 20–50)

## 2024-12-18 PROCEDURE — 73630 X-RAY EXAM OF FOOT: CPT | Mod: TC | Performed by: RADIOLOGY

## 2024-12-18 NOTE — PROGRESS NOTES
Clinic Care Coordination Contact  Community Health Worker Initial Outreach            Patient accepts CC: No, CC referral was discussed with patient today, declined CC services at this time- Coty and her  have reached out to [facility] and have found a functional medicine provider at [facility].. Patient will be sent Care Coordination introduction letter for future reference.     CHW will send message to PCP on patient's behalf re: dermatology referral parameters- as she ran into an issue last year with dermatology as [facility] allowed her 4 visits within 5 months- Coty is hoping for the current dermatology referral to be placed for a 1 year time frame to eliminate any uncovered costs due to referral parameters.     Order Questions    Question Answer   Reason for Referral: Other   My Clinical Question Is: Functional medidine   Clinical Staff have discussed the Care Coordination Referral with the patient and/or caregiver: Yes     Lillian CALI  Community Health Worker  [facility]  Clinic Care Coordination  [email or web address]  Office: [phone]

## 2024-12-18 NOTE — LETTER
[facility] CARE COORDINATION  [address]  [place] [address]    December 18, 2024    Coty Cardona  [address]  [facility] [address]      Dear Coty,    I am a clinic community health worker who works with Chilo Rao PA-C with the [facility]. I wanted to thank you for spending the time to talk with me.  Below is a description of clinic care coordination and how I can further assist you.       The clinic care coordination team is made up of a registered nurse, , financial resource worker and community health worker who understand the health care system. The goal of clinic care coordination is to help you manage your health and improve access to the health care system. Our team works alongside your provider to assist you in determining your health and social needs. We can help you obtain health care and community resources, providing you with necessary information and education. We can work with you through any barriers and develop a care plan that helps coordinate and strengthen the communication between you and your care team.  Our services are voluntary and are offered without charge to you personally.    Please feel free to contact me with any questions or concerns regarding care coordination and what we can offer.      We are focused on providing you with the highest-quality healthcare experience possible.    Sincerely,     Lillian CALI  Community Health Worker  [facility] Care Coordination  [place], [email or web address]  Office: [phone]

## 2025-04-14 ENCOUNTER — TELEPHONE (OUTPATIENT)
Dept: FAMILY MEDICINE | Facility: CLINIC | Age: 55
End: 2025-04-14
Payer: COMMERCIAL

## 2025-04-14 DIAGNOSIS — M81.8 IDIOPATHIC OSTEOPOROSIS: Primary | ICD-10-CM

## 2025-04-14 NOTE — TELEPHONE ENCOUNTER
Order/Referral Request    Who is requesting: patient     Orders being requested: acupuncture for chronic pain     Reason service is needed/diagnosis: patient     When are orders needed by: as soon as possible    Has this been discussed with Provider: No    Does patient have a preference on a Group/Provider/Facility? [facility], Dr. Marquez, # [phone] Fax# [phone]      Does patient have an appointment scheduled?: Yes: 4/14/25    Where to send orders: Fax    Could we send this information to you in [employer] or would you prefer to receive a phone call?:   Patient would prefer a phone call   Okay to leave a detailed message?: Yes at Cell number on file:    Telephone Information:   Mobile [phone]

## 2025-04-14 NOTE — TELEPHONE ENCOUNTER
Acupuncture referral and Selena Dunn referral from Dec 2024 faxed.    Amara Ndiaye MA on 4/14/2025 at 3:56 PM